# Patient Record
Sex: MALE | Race: WHITE | NOT HISPANIC OR LATINO | Employment: OTHER | URBAN - METROPOLITAN AREA
[De-identification: names, ages, dates, MRNs, and addresses within clinical notes are randomized per-mention and may not be internally consistent; named-entity substitution may affect disease eponyms.]

---

## 2022-07-01 ENCOUNTER — TRANSCRIBE ORDERS (OUTPATIENT)
Dept: ADMINISTRATIVE | Facility: HOSPITAL | Age: 77
End: 2022-07-01

## 2022-07-01 DIAGNOSIS — N18.32 STAGE 3B CHRONIC KIDNEY DISEASE: Primary | ICD-10-CM

## 2022-07-22 ENCOUNTER — TRANSCRIBE ORDERS (OUTPATIENT)
Dept: ADMINISTRATIVE | Facility: HOSPITAL | Age: 77
End: 2022-07-22

## 2022-07-22 ENCOUNTER — HOSPITAL ENCOUNTER (OUTPATIENT)
Dept: ULTRASOUND IMAGING | Facility: HOSPITAL | Age: 77
Discharge: HOME | End: 2022-07-22

## 2022-07-22 ENCOUNTER — LAB (OUTPATIENT)
Dept: LAB | Facility: HOSPITAL | Age: 77
End: 2022-07-22

## 2022-07-22 DIAGNOSIS — E55.9 VITAMIN D DEFICIENCY: ICD-10-CM

## 2022-07-22 DIAGNOSIS — N18.30 STAGE 3 CHRONIC KIDNEY DISEASE, UNSPECIFIED WHETHER STAGE 3A OR 3B CKD: ICD-10-CM

## 2022-07-22 DIAGNOSIS — N18.32 STAGE 3B CHRONIC KIDNEY DISEASE: ICD-10-CM

## 2022-07-22 DIAGNOSIS — N18.30 STAGE 3 CHRONIC KIDNEY DISEASE, UNSPECIFIED WHETHER STAGE 3A OR 3B CKD: Primary | ICD-10-CM

## 2022-07-22 DIAGNOSIS — I51.9 MYXEDEMA HEART DISEASE: ICD-10-CM

## 2022-07-22 DIAGNOSIS — E11.8 DIABETIC COMPLICATION: ICD-10-CM

## 2022-07-22 DIAGNOSIS — E03.9 MYXEDEMA HEART DISEASE: ICD-10-CM

## 2022-07-22 LAB
25(OH)D3 SERPL-MCNC: 52.1 NG/ML (ref 30–100)
ALBUMIN SERPL-MCNC: 4.1 G/DL (ref 3.5–5.2)
ALBUMIN/GLOB SERPL: 1.4 G/DL
ALP SERPL-CCNC: 129 U/L (ref 39–117)
ALT SERPL W P-5'-P-CCNC: 23 U/L (ref 1–41)
ANION GAP SERPL CALCULATED.3IONS-SCNC: 15 MMOL/L (ref 5–15)
AST SERPL-CCNC: 21 U/L (ref 1–40)
BASOPHILS # BLD AUTO: 0.22 10*3/MM3 (ref 0–0.2)
BASOPHILS NFR BLD AUTO: 1.9 % (ref 0–1.5)
BILIRUB SERPL-MCNC: 0.4 MG/DL (ref 0–1.2)
BILIRUB UR QL STRIP: NEGATIVE
BUN SERPL-MCNC: 26 MG/DL (ref 8–23)
BUN/CREAT SERPL: 14.6 (ref 7–25)
CALCIUM SPEC-SCNC: 9.4 MG/DL (ref 8.6–10.5)
CHLORIDE SERPL-SCNC: 106 MMOL/L (ref 98–107)
CK SERPL-CCNC: 42 U/L (ref 20–200)
CLARITY UR: CLEAR
CO2 SERPL-SCNC: 20 MMOL/L (ref 22–29)
COLOR UR: YELLOW
CREAT SERPL-MCNC: 1.78 MG/DL (ref 0.76–1.27)
CREAT UR-MCNC: 179.9 MG/DL
DEPRECATED RDW RBC AUTO: 48.8 FL (ref 37–54)
EGFRCR SERPLBLD CKD-EPI 2021: 39 ML/MIN/1.73
EOSINOPHIL # BLD AUTO: 0.44 10*3/MM3 (ref 0–0.4)
EOSINOPHIL NFR BLD AUTO: 3.9 % (ref 0.3–6.2)
ERYTHROCYTE [DISTWIDTH] IN BLOOD BY AUTOMATED COUNT: 14.3 % (ref 12.3–15.4)
GLOBULIN UR ELPH-MCNC: 2.9 GM/DL
GLUCOSE SERPL-MCNC: 118 MG/DL (ref 65–99)
GLUCOSE UR STRIP-MCNC: NEGATIVE MG/DL
HBA1C MFR BLD: 6.4 % (ref 3.5–5.6)
HCT VFR BLD AUTO: 38.6 % (ref 37.5–51)
HGB BLD-MCNC: 13 G/DL (ref 13–17.7)
HGB UR QL STRIP.AUTO: NEGATIVE
IMM GRANULOCYTES # BLD AUTO: 0.16 10*3/MM3 (ref 0–0.05)
IMM GRANULOCYTES NFR BLD AUTO: 1.4 % (ref 0–0.5)
KETONES UR QL STRIP: ABNORMAL
LEUKOCYTE ESTERASE UR QL STRIP.AUTO: NEGATIVE
LYMPHOCYTES # BLD AUTO: 2.09 10*3/MM3 (ref 0.7–3.1)
LYMPHOCYTES NFR BLD AUTO: 18.4 % (ref 19.6–45.3)
MCH RBC QN AUTO: 31.6 PG (ref 26.6–33)
MCHC RBC AUTO-ENTMCNC: 33.7 G/DL (ref 31.5–35.7)
MCV RBC AUTO: 93.9 FL (ref 79–97)
MONOCYTES # BLD AUTO: 0.96 10*3/MM3 (ref 0.1–0.9)
MONOCYTES NFR BLD AUTO: 8.4 % (ref 5–12)
NEUTROPHILS NFR BLD AUTO: 66 % (ref 42.7–76)
NEUTROPHILS NFR BLD AUTO: 7.51 10*3/MM3 (ref 1.7–7)
NITRITE UR QL STRIP: NEGATIVE
NRBC BLD AUTO-RTO: 0 /100 WBC (ref 0–0.2)
PH UR STRIP.AUTO: 5.5 [PH] (ref 5–8)
PLATELET # BLD AUTO: 531 10*3/MM3 (ref 140–450)
PMV BLD AUTO: 10.2 FL (ref 6–12)
POTASSIUM SERPL-SCNC: 4 MMOL/L (ref 3.5–5.2)
PROT ?TM UR-MCNC: 19.4 MG/DL
PROT SERPL-MCNC: 7 G/DL (ref 6–8.5)
PROT UR QL STRIP: ABNORMAL
PROT/CREAT UR: 107.8 MG/G CREA (ref 0–200)
PTH-INTACT SERPL-MCNC: 50 PG/ML (ref 15–65)
RBC # BLD AUTO: 4.11 10*6/MM3 (ref 4.14–5.8)
SODIUM SERPL-SCNC: 141 MMOL/L (ref 136–145)
SP GR UR STRIP: 1.02 (ref 1–1.03)
TSH SERPL DL<=0.05 MIU/L-ACNC: 1.88 UIU/ML (ref 0.27–4.2)
URATE SERPL-MCNC: 7.4 MG/DL (ref 3.4–7)
UROBILINOGEN UR QL STRIP: ABNORMAL
WBC NRBC COR # BLD: 11.38 10*3/MM3 (ref 3.4–10.8)

## 2022-07-22 PROCEDURE — 82550 ASSAY OF CK (CPK): CPT

## 2022-07-22 PROCEDURE — 86038 ANTINUCLEAR ANTIBODIES: CPT

## 2022-07-22 PROCEDURE — 82306 VITAMIN D 25 HYDROXY: CPT

## 2022-07-22 PROCEDURE — 82570 ASSAY OF URINE CREATININE: CPT

## 2022-07-22 PROCEDURE — 84443 ASSAY THYROID STIM HORMONE: CPT

## 2022-07-22 PROCEDURE — 76775 US EXAM ABDO BACK WALL LIM: CPT

## 2022-07-22 PROCEDURE — 83970 ASSAY OF PARATHORMONE: CPT

## 2022-07-22 PROCEDURE — 82784 ASSAY IGA/IGD/IGG/IGM EACH: CPT

## 2022-07-22 PROCEDURE — 84550 ASSAY OF BLOOD/URIC ACID: CPT

## 2022-07-22 PROCEDURE — 84156 ASSAY OF PROTEIN URINE: CPT

## 2022-07-22 PROCEDURE — 36415 COLL VENOUS BLD VENIPUNCTURE: CPT

## 2022-07-22 PROCEDURE — 85025 COMPLETE CBC W/AUTO DIFF WBC: CPT

## 2022-07-22 PROCEDURE — 80053 COMPREHEN METABOLIC PANEL: CPT

## 2022-07-22 PROCEDURE — 86334 IMMUNOFIX E-PHORESIS SERUM: CPT

## 2022-07-22 PROCEDURE — 81003 URINALYSIS AUTO W/O SCOPE: CPT

## 2022-07-22 PROCEDURE — 83036 HEMOGLOBIN GLYCOSYLATED A1C: CPT

## 2022-07-25 LAB
ANA SER QL: NEGATIVE
IGA SERPL-MCNC: 318 MG/DL (ref 61–437)
IGG SERPL-MCNC: 875 MG/DL (ref 603–1613)
IGM SERPL-MCNC: 104 MG/DL (ref 15–143)
PROT PATTERN SERPL IFE-IMP: NORMAL

## 2023-08-02 ENCOUNTER — TRANSCRIBE ORDERS (OUTPATIENT)
Dept: ADMINISTRATIVE | Facility: HOSPITAL | Age: 78
End: 2023-08-02
Payer: MEDICARE

## 2023-08-02 ENCOUNTER — LAB (OUTPATIENT)
Dept: LAB | Facility: HOSPITAL | Age: 78
End: 2023-08-02
Payer: MEDICARE

## 2023-08-02 DIAGNOSIS — E79.0 URICACIDEMIA: ICD-10-CM

## 2023-08-02 DIAGNOSIS — R80.9 PROTEINURIA, UNSPECIFIED TYPE: ICD-10-CM

## 2023-08-02 DIAGNOSIS — E11.8 DIABETIC COMPLICATION: ICD-10-CM

## 2023-08-02 DIAGNOSIS — N18.30 STAGE 3 CHRONIC KIDNEY DISEASE, UNSPECIFIED WHETHER STAGE 3A OR 3B CKD: ICD-10-CM

## 2023-08-02 DIAGNOSIS — E55.9 AVITAMINOSIS D: ICD-10-CM

## 2023-08-02 DIAGNOSIS — E78.5 HYPERLIPIDEMIA, UNSPECIFIED HYPERLIPIDEMIA TYPE: ICD-10-CM

## 2023-08-02 DIAGNOSIS — E03.9 ADULT MYXEDEMA: ICD-10-CM

## 2023-08-02 DIAGNOSIS — N18.30 STAGE 3 CHRONIC KIDNEY DISEASE, UNSPECIFIED WHETHER STAGE 3A OR 3B CKD: Primary | ICD-10-CM

## 2023-08-02 LAB
25(OH)D3 SERPL-MCNC: 44.9 NG/ML (ref 30–100)
ANION GAP SERPL CALCULATED.3IONS-SCNC: 13 MMOL/L (ref 5–15)
BASOPHILS # BLD AUTO: 0 10*3/MM3 (ref 0–0.2)
BASOPHILS NFR BLD AUTO: 0.3 % (ref 0–1.5)
BILIRUB UR QL STRIP: NEGATIVE
BUN SERPL-MCNC: 25 MG/DL (ref 8–23)
BUN/CREAT SERPL: 12.4 (ref 7–25)
CALCIUM SPEC-SCNC: 9 MG/DL (ref 8.6–10.5)
CHLORIDE SERPL-SCNC: 104 MMOL/L (ref 98–107)
CK SERPL-CCNC: 57 U/L (ref 20–200)
CLARITY UR: CLEAR
CO2 SERPL-SCNC: 23 MMOL/L (ref 22–29)
COLOR UR: YELLOW
CREAT SERPL-MCNC: 2.02 MG/DL (ref 0.76–1.27)
CREAT UR-MCNC: 139.2 MG/DL
DEPRECATED RDW RBC AUTO: 52.9 FL (ref 37–54)
EGFRCR SERPLBLD CKD-EPI 2021: 33.3 ML/MIN/1.73
EOSINOPHIL # BLD AUTO: 0.3 10*3/MM3 (ref 0–0.4)
EOSINOPHIL NFR BLD AUTO: 4 % (ref 0.3–6.2)
ERYTHROCYTE [DISTWIDTH] IN BLOOD BY AUTOMATED COUNT: 16 % (ref 12.3–15.4)
GLUCOSE SERPL-MCNC: 110 MG/DL (ref 65–99)
GLUCOSE UR STRIP-MCNC: ABNORMAL MG/DL
HBA1C MFR BLD: 6.3 % (ref 4.8–5.6)
HCT VFR BLD AUTO: 42.9 % (ref 37.5–51)
HGB BLD-MCNC: 14.1 G/DL (ref 13–17.7)
HGB UR QL STRIP.AUTO: NEGATIVE
KETONES UR QL STRIP: ABNORMAL
LEUKOCYTE ESTERASE UR QL STRIP.AUTO: NEGATIVE
LYMPHOCYTES # BLD AUTO: 1.4 10*3/MM3 (ref 0.7–3.1)
LYMPHOCYTES NFR BLD AUTO: 17.5 % (ref 19.6–45.3)
MCH RBC QN AUTO: 31.3 PG (ref 26.6–33)
MCHC RBC AUTO-ENTMCNC: 32.8 G/DL (ref 31.5–35.7)
MCV RBC AUTO: 95.6 FL (ref 79–97)
MONOCYTES # BLD AUTO: 0.7 10*3/MM3 (ref 0.1–0.9)
MONOCYTES NFR BLD AUTO: 8.8 % (ref 5–12)
NEUTROPHILS NFR BLD AUTO: 5.6 10*3/MM3 (ref 1.7–7)
NEUTROPHILS NFR BLD AUTO: 69.4 % (ref 42.7–76)
NITRITE UR QL STRIP: NEGATIVE
NRBC BLD AUTO-RTO: 0 /100 WBC (ref 0–0.2)
PH UR STRIP.AUTO: <=5 [PH] (ref 5–8)
PHOSPHATE SERPL-MCNC: 3.7 MG/DL (ref 2.5–4.5)
PLATELET # BLD AUTO: 379 10*3/MM3 (ref 140–450)
PMV BLD AUTO: 8.4 FL (ref 6–12)
POTASSIUM SERPL-SCNC: 3.9 MMOL/L (ref 3.5–5.2)
PROT ?TM UR-MCNC: 12.4 MG/DL
PROT UR QL STRIP: NEGATIVE
PROT/CREAT UR: 89.1 MG/G CREA (ref 0–200)
PTH-INTACT SERPL-MCNC: 68.7 PG/ML (ref 15–65)
RBC # BLD AUTO: 4.49 10*6/MM3 (ref 4.14–5.8)
SODIUM SERPL-SCNC: 140 MMOL/L (ref 136–145)
SP GR UR STRIP: 1.03 (ref 1–1.03)
TSH SERPL DL<=0.05 MIU/L-ACNC: 1.97 UIU/ML (ref 0.27–4.2)
URATE SERPL-MCNC: 5.9 MG/DL (ref 3.4–7)
UROBILINOGEN UR QL STRIP: ABNORMAL
WBC NRBC COR # BLD: 8.1 10*3/MM3 (ref 3.4–10.8)

## 2023-08-02 PROCEDURE — 81003 URINALYSIS AUTO W/O SCOPE: CPT

## 2023-08-02 PROCEDURE — 36415 COLL VENOUS BLD VENIPUNCTURE: CPT

## 2023-08-02 PROCEDURE — 83970 ASSAY OF PARATHORMONE: CPT

## 2023-08-02 PROCEDURE — 82550 ASSAY OF CK (CPK): CPT

## 2023-08-02 PROCEDURE — 82570 ASSAY OF URINE CREATININE: CPT

## 2023-08-02 PROCEDURE — 84550 ASSAY OF BLOOD/URIC ACID: CPT

## 2023-08-02 PROCEDURE — 84443 ASSAY THYROID STIM HORMONE: CPT

## 2023-08-02 PROCEDURE — 82306 VITAMIN D 25 HYDROXY: CPT

## 2023-08-02 PROCEDURE — 84156 ASSAY OF PROTEIN URINE: CPT

## 2023-08-02 PROCEDURE — 84100 ASSAY OF PHOSPHORUS: CPT

## 2023-08-02 PROCEDURE — 83036 HEMOGLOBIN GLYCOSYLATED A1C: CPT

## 2023-08-02 PROCEDURE — 80048 BASIC METABOLIC PNL TOTAL CA: CPT

## 2023-08-02 PROCEDURE — 85025 COMPLETE CBC W/AUTO DIFF WBC: CPT

## 2024-07-08 ENCOUNTER — HOSPITAL ENCOUNTER (INPATIENT)
Facility: HOSPITAL | Age: 79
LOS: 2 days | Discharge: NURSING, REHAB OR LONG-TERM CARE | End: 2024-07-12
Attending: INTERNAL MEDICINE | Admitting: INTERNAL MEDICINE
Payer: MEDICARE

## 2024-07-08 ENCOUNTER — APPOINTMENT (OUTPATIENT)
Dept: MRI IMAGING | Facility: HOSPITAL | Age: 79
End: 2024-07-08
Payer: MEDICARE

## 2024-07-08 ENCOUNTER — APPOINTMENT (OUTPATIENT)
Dept: GENERAL RADIOLOGY | Facility: HOSPITAL | Age: 79
End: 2024-07-08
Payer: MEDICARE

## 2024-07-08 ENCOUNTER — APPOINTMENT (OUTPATIENT)
Dept: CT IMAGING | Facility: HOSPITAL | Age: 79
End: 2024-07-08
Payer: MEDICARE

## 2024-07-08 DIAGNOSIS — R29.6 MULTIPLE FALLS: ICD-10-CM

## 2024-07-08 DIAGNOSIS — M25.551 ACUTE HIP PAIN, RIGHT: Primary | ICD-10-CM

## 2024-07-08 DIAGNOSIS — S82.64XA CLOSED NONDISPLACED FRACTURE OF LATERAL MALLEOLUS OF RIGHT FIBULA, INITIAL ENCOUNTER: ICD-10-CM

## 2024-07-08 LAB
ALBUMIN SERPL-MCNC: 4.6 G/DL (ref 3.5–5.2)
ALBUMIN/GLOB SERPL: 1.5 G/DL
ALP SERPL-CCNC: 134 U/L (ref 39–117)
ALT SERPL W P-5'-P-CCNC: 26 U/L (ref 1–41)
AMMONIA BLD-SCNC: 17 UMOL/L (ref 16–60)
ANION GAP SERPL CALCULATED.3IONS-SCNC: 15.9 MMOL/L (ref 5–15)
AST SERPL-CCNC: 26 U/L (ref 1–40)
B PARAPERT DNA SPEC QL NAA+PROBE: NOT DETECTED
B PERT DNA SPEC QL NAA+PROBE: NOT DETECTED
BASOPHILS # BLD AUTO: 0.03 10*3/MM3 (ref 0–0.2)
BASOPHILS NFR BLD AUTO: 0.1 % (ref 0–1.5)
BILIRUB SERPL-MCNC: 0.5 MG/DL (ref 0–1.2)
BUN SERPL-MCNC: 33 MG/DL (ref 8–23)
BUN/CREAT SERPL: 17.6 (ref 7–25)
C PNEUM DNA NPH QL NAA+NON-PROBE: NOT DETECTED
CALCIUM SPEC-SCNC: 9.7 MG/DL (ref 8.6–10.5)
CHLORIDE SERPL-SCNC: 104 MMOL/L (ref 98–107)
CK SERPL-CCNC: 138 U/L (ref 20–200)
CO2 SERPL-SCNC: 21.1 MMOL/L (ref 22–29)
CREAT SERPL-MCNC: 1.87 MG/DL (ref 0.76–1.27)
DEPRECATED RDW RBC AUTO: 57.6 FL (ref 37–54)
EGFRCR SERPLBLD CKD-EPI 2021: 36.3 ML/MIN/1.73
EOSINOPHIL # BLD AUTO: 0 10*3/MM3 (ref 0–0.4)
EOSINOPHIL NFR BLD AUTO: 0 % (ref 0.3–6.2)
ERYTHROCYTE [DISTWIDTH] IN BLOOD BY AUTOMATED COUNT: 16 % (ref 12.3–15.4)
FLUAV SUBTYP SPEC NAA+PROBE: NOT DETECTED
FLUBV RNA ISLT QL NAA+PROBE: NOT DETECTED
GLOBULIN UR ELPH-MCNC: 3.1 GM/DL
GLUCOSE BLDC GLUCOMTR-MCNC: 192 MG/DL (ref 70–105)
GLUCOSE SERPL-MCNC: 141 MG/DL (ref 65–99)
HADV DNA SPEC NAA+PROBE: NOT DETECTED
HBA1C MFR BLD: 6.55 % (ref 4.8–5.6)
HCOV 229E RNA SPEC QL NAA+PROBE: NOT DETECTED
HCOV HKU1 RNA SPEC QL NAA+PROBE: NOT DETECTED
HCOV NL63 RNA SPEC QL NAA+PROBE: NOT DETECTED
HCOV OC43 RNA SPEC QL NAA+PROBE: NOT DETECTED
HCT VFR BLD AUTO: 45.9 % (ref 37.5–51)
HGB BLD-MCNC: 15.2 G/DL (ref 13–17.7)
HMPV RNA NPH QL NAA+NON-PROBE: NOT DETECTED
HOLD SPECIMEN: NORMAL
HOLD SPECIMEN: NORMAL
HPIV1 RNA ISLT QL NAA+PROBE: NOT DETECTED
HPIV2 RNA SPEC QL NAA+PROBE: NOT DETECTED
HPIV3 RNA NPH QL NAA+PROBE: NOT DETECTED
HPIV4 P GENE NPH QL NAA+PROBE: NOT DETECTED
IMM GRANULOCYTES # BLD AUTO: 0.16 10*3/MM3 (ref 0–0.05)
IMM GRANULOCYTES NFR BLD AUTO: 0.8 % (ref 0–0.5)
LYMPHOCYTES # BLD AUTO: 0.88 10*3/MM3 (ref 0.7–3.1)
LYMPHOCYTES NFR BLD AUTO: 4.3 % (ref 19.6–45.3)
M PNEUMO IGG SER IA-ACNC: NOT DETECTED
MCH RBC QN AUTO: 31.9 PG (ref 26.6–33)
MCHC RBC AUTO-ENTMCNC: 33.1 G/DL (ref 31.5–35.7)
MCV RBC AUTO: 96.2 FL (ref 79–97)
MONOCYTES # BLD AUTO: 1.69 10*3/MM3 (ref 0.1–0.9)
MONOCYTES NFR BLD AUTO: 8.3 % (ref 5–12)
NEUTROPHILS NFR BLD AUTO: 17.59 10*3/MM3 (ref 1.7–7)
NEUTROPHILS NFR BLD AUTO: 86.5 % (ref 42.7–76)
NRBC BLD AUTO-RTO: 0 /100 WBC (ref 0–0.2)
PLATELET # BLD AUTO: 469 10*3/MM3 (ref 140–450)
PMV BLD AUTO: 10.7 FL (ref 6–12)
POTASSIUM SERPL-SCNC: 3.7 MMOL/L (ref 3.5–5.2)
PROT SERPL-MCNC: 7.7 G/DL (ref 6–8.5)
RBC # BLD AUTO: 4.77 10*6/MM3 (ref 4.14–5.8)
RHINOVIRUS RNA SPEC NAA+PROBE: NOT DETECTED
RSV RNA NPH QL NAA+NON-PROBE: NOT DETECTED
SARS-COV-2 RNA NPH QL NAA+NON-PROBE: NOT DETECTED
SARS-COV-2 RNA RESP QL NAA+PROBE: NOT DETECTED
SODIUM SERPL-SCNC: 141 MMOL/L (ref 136–145)
WBC NRBC COR # BLD AUTO: 20.35 10*3/MM3 (ref 3.4–10.8)
WHOLE BLOOD HOLD COAG: NORMAL
WHOLE BLOOD HOLD SPECIMEN: NORMAL

## 2024-07-08 PROCEDURE — 99285 EMERGENCY DEPT VISIT HI MDM: CPT

## 2024-07-08 PROCEDURE — 80053 COMPREHEN METABOLIC PANEL: CPT | Performed by: NURSE PRACTITIONER

## 2024-07-08 PROCEDURE — G0378 HOSPITAL OBSERVATION PER HR: HCPCS

## 2024-07-08 PROCEDURE — 83036 HEMOGLOBIN GLYCOSYLATED A1C: CPT | Performed by: INTERNAL MEDICINE

## 2024-07-08 PROCEDURE — 36415 COLL VENOUS BLD VENIPUNCTURE: CPT

## 2024-07-08 PROCEDURE — 73610 X-RAY EXAM OF ANKLE: CPT

## 2024-07-08 PROCEDURE — 82550 ASSAY OF CK (CPK): CPT | Performed by: INTERNAL MEDICINE

## 2024-07-08 PROCEDURE — 63710000001 ONDANSETRON ODT 4 MG TABLET DISPERSIBLE: Performed by: NURSE PRACTITIONER

## 2024-07-08 PROCEDURE — 73700 CT LOWER EXTREMITY W/O DYE: CPT

## 2024-07-08 PROCEDURE — 73721 MRI JNT OF LWR EXTRE W/O DYE: CPT

## 2024-07-08 PROCEDURE — 87635 SARS-COV-2 COVID-19 AMP PRB: CPT | Performed by: INTERNAL MEDICINE

## 2024-07-08 PROCEDURE — 71045 X-RAY EXAM CHEST 1 VIEW: CPT

## 2024-07-08 PROCEDURE — 85025 COMPLETE CBC W/AUTO DIFF WBC: CPT | Performed by: NURSE PRACTITIONER

## 2024-07-08 PROCEDURE — 87040 BLOOD CULTURE FOR BACTERIA: CPT | Performed by: INTERNAL MEDICINE

## 2024-07-08 PROCEDURE — 82948 REAGENT STRIP/BLOOD GLUCOSE: CPT

## 2024-07-08 PROCEDURE — 82140 ASSAY OF AMMONIA: CPT | Performed by: INTERNAL MEDICINE

## 2024-07-08 PROCEDURE — 25010000002 DIPHENHYDRAMINE PER 50 MG

## 2024-07-08 PROCEDURE — 0202U NFCT DS 22 TRGT SARS-COV-2: CPT | Performed by: INTERNAL MEDICINE

## 2024-07-08 RX ORDER — DAPAGLIFLOZIN 10 MG/1
10 TABLET, FILM COATED ORAL DAILY
COMMUNITY

## 2024-07-08 RX ORDER — HYDROCODONE BITARTRATE AND ACETAMINOPHEN 7.5; 325 MG/1; MG/1
1 TABLET ORAL EVERY 4 HOURS PRN
Status: DISCONTINUED | OUTPATIENT
Start: 2024-07-08 | End: 2024-07-10

## 2024-07-08 RX ORDER — SODIUM CHLORIDE 0.9 % (FLUSH) 0.9 %
10 SYRINGE (ML) INJECTION AS NEEDED
Status: DISCONTINUED | OUTPATIENT
Start: 2024-07-08 | End: 2024-07-12 | Stop reason: HOSPADM

## 2024-07-08 RX ORDER — FLUOXETINE HYDROCHLORIDE 40 MG/1
40 CAPSULE ORAL DAILY
COMMUNITY

## 2024-07-08 RX ORDER — DIPHENHYDRAMINE HYDROCHLORIDE 50 MG/ML
50 INJECTION INTRAMUSCULAR; INTRAVENOUS ONCE
Status: COMPLETED | OUTPATIENT
Start: 2024-07-08 | End: 2024-07-08

## 2024-07-08 RX ORDER — ZOLPIDEM TARTRATE 10 MG/1
10 TABLET ORAL NIGHTLY PRN
COMMUNITY
End: 2024-07-12 | Stop reason: HOSPADM

## 2024-07-08 RX ORDER — LEVOTHYROXINE SODIUM 0.05 MG/1
50 TABLET ORAL DAILY
COMMUNITY

## 2024-07-08 RX ORDER — ONDANSETRON 2 MG/ML
4 INJECTION INTRAMUSCULAR; INTRAVENOUS EVERY 6 HOURS PRN
Status: DISCONTINUED | OUTPATIENT
Start: 2024-07-08 | End: 2024-07-12 | Stop reason: HOSPADM

## 2024-07-08 RX ORDER — ACETAMINOPHEN 325 MG/1
650 TABLET ORAL EVERY 4 HOURS PRN
Status: DISCONTINUED | OUTPATIENT
Start: 2024-07-08 | End: 2024-07-11

## 2024-07-08 RX ORDER — HYDROCODONE BITARTRATE AND ACETAMINOPHEN 7.5; 325 MG/1; MG/1
1 TABLET ORAL ONCE
Status: COMPLETED | OUTPATIENT
Start: 2024-07-08 | End: 2024-07-08

## 2024-07-08 RX ORDER — SODIUM CHLORIDE 0.9 % (FLUSH) 0.9 %
10 SYRINGE (ML) INJECTION EVERY 12 HOURS SCHEDULED
Status: DISCONTINUED | OUTPATIENT
Start: 2024-07-08 | End: 2024-07-12 | Stop reason: HOSPADM

## 2024-07-08 RX ORDER — AMOXICILLIN 250 MG
2 CAPSULE ORAL 2 TIMES DAILY PRN
Status: DISCONTINUED | OUTPATIENT
Start: 2024-07-08 | End: 2024-07-12 | Stop reason: HOSPADM

## 2024-07-08 RX ORDER — LIDOCAINE 4 G/G
1 PATCH TOPICAL ONCE
Status: DISCONTINUED | OUTPATIENT
Start: 2024-07-08 | End: 2024-07-09

## 2024-07-08 RX ORDER — SODIUM CHLORIDE 9 MG/ML
40 INJECTION, SOLUTION INTRAVENOUS AS NEEDED
Status: DISCONTINUED | OUTPATIENT
Start: 2024-07-08 | End: 2024-07-12 | Stop reason: HOSPADM

## 2024-07-08 RX ORDER — FAMOTIDINE 20 MG/1
40 TABLET, FILM COATED ORAL DAILY
Status: DISCONTINUED | OUTPATIENT
Start: 2024-07-08 | End: 2024-07-10

## 2024-07-08 RX ORDER — BISACODYL 10 MG
10 SUPPOSITORY, RECTAL RECTAL DAILY PRN
Status: DISCONTINUED | OUTPATIENT
Start: 2024-07-08 | End: 2024-07-12 | Stop reason: HOSPADM

## 2024-07-08 RX ORDER — METHOCARBAMOL 750 MG/1
750 TABLET, FILM COATED ORAL 3 TIMES DAILY PRN
COMMUNITY

## 2024-07-08 RX ORDER — METHOCARBAMOL 500 MG/1
500 TABLET, FILM COATED ORAL ONCE
Status: COMPLETED | OUTPATIENT
Start: 2024-07-08 | End: 2024-07-08

## 2024-07-08 RX ORDER — ALLOPURINOL 100 MG/1
100 TABLET ORAL DAILY
COMMUNITY

## 2024-07-08 RX ORDER — AMLODIPINE BESYLATE 5 MG/1
5 TABLET ORAL DAILY
COMMUNITY

## 2024-07-08 RX ORDER — PIOGLITAZONEHYDROCHLORIDE 30 MG/1
30 TABLET ORAL DAILY
COMMUNITY

## 2024-07-08 RX ORDER — POLYETHYLENE GLYCOL 3350 17 G/17G
17 POWDER, FOR SOLUTION ORAL DAILY PRN
Status: DISCONTINUED | OUTPATIENT
Start: 2024-07-08 | End: 2024-07-12 | Stop reason: HOSPADM

## 2024-07-08 RX ORDER — ATORVASTATIN CALCIUM 40 MG/1
40 TABLET, FILM COATED ORAL DAILY
COMMUNITY

## 2024-07-08 RX ORDER — ONDANSETRON 4 MG/1
4 TABLET, ORALLY DISINTEGRATING ORAL ONCE
Status: COMPLETED | OUTPATIENT
Start: 2024-07-08 | End: 2024-07-08

## 2024-07-08 RX ORDER — BISACODYL 5 MG/1
5 TABLET, DELAYED RELEASE ORAL DAILY PRN
Status: DISCONTINUED | OUTPATIENT
Start: 2024-07-08 | End: 2024-07-12 | Stop reason: HOSPADM

## 2024-07-08 RX ADMIN — ONDANSETRON 4 MG: 4 TABLET, ORALLY DISINTEGRATING ORAL at 15:49

## 2024-07-08 RX ADMIN — DIPHENHYDRAMINE HYDROCHLORIDE 50 MG: 50 INJECTION, SOLUTION INTRAMUSCULAR; INTRAVENOUS at 23:12

## 2024-07-08 RX ADMIN — FAMOTIDINE 40 MG: 20 TABLET, FILM COATED ORAL at 23:12

## 2024-07-08 RX ADMIN — LIDOCAINE 1 PATCH: 4 PATCH TOPICAL at 15:48

## 2024-07-08 RX ADMIN — METHOCARBAMOL 500 MG: 500 TABLET ORAL at 15:49

## 2024-07-08 RX ADMIN — Medication 10 ML: at 23:13

## 2024-07-08 RX ADMIN — HYDROCODONE BITARTRATE AND ACETAMINOPHEN 1 TABLET: 7.5; 325 TABLET ORAL at 15:49

## 2024-07-08 NOTE — ED PROVIDER NOTES
Subjective     Provider in Triage Note  Due to significant overcrowding in the emergency department patient was initially seen and evaluated in triage.  Provider in triage recommended patient placement in the treatment area to initiate therapy and movement to an ER bed as soon as possible.    Patient presents with complaints of right hip and lower back pain.  Recently seen by [facility] a few days ago and diagnosed with sciatica. Given steroids and muscle relaxers with no improvement.  Fell twice today due to pain and weakness in right leg.  Injured right ankle.  No saddle anesthesia.  No bowel or bladder incontinence.      History of Present Illness  I reviewed the provider in triage note and attest this is the HPI    Patient was seen on July 5 at [facility] urgent care and had an x-ray which was read as no immediate fracture he was referred to Dr. Faria the neurosurgeon for further evaluation but he has not seen them he was given a Decadron 10 mg IM injection at that time.  Pain has increased and he had another fall this morning  He lives at home alone        Review of Systems   Musculoskeletal:         Right hip pain  Right ankle pain and swelling   Skin:  Positive for color change.        Right ankle         History reviewed. No pertinent past medical history.    No Known Allergies    History reviewed. No pertinent surgical history.    History reviewed. No pertinent family history.    Social History     Socioeconomic History    Marital status: Unknown           Objective   Physical Exam  Vitals reviewed.   Constitutional:       Appearance: He is well-developed.   HENT:      Head: Normocephalic and atraumatic.   Eyes:      Conjunctiva/sclera: Conjunctivae normal.      Pupils: Pupils are equal, round, and reactive to light.   Cardiovascular:      Rate and Rhythm: Normal rate and regular rhythm.      Heart sounds: Normal heart sounds.   Pulmonary:      Effort: Pulmonary effort is normal.      Breath sounds: Normal breath  "sounds.   Abdominal:      General: Bowel sounds are normal.      Palpations: Abdomen is soft.   Musculoskeletal:         General: Normal range of motion.      Cervical back: Normal, normal range of motion and neck supple.      Thoracic back: Normal.      Lumbar back: Normal.      Right hip: Tenderness present. Decreased range of motion. Decreased strength.      Left hip: Normal.        Legs:    Skin:     General: Skin is warm and dry.      Capillary Refill: Capillary refill takes 2 to 3 seconds.   Neurological:      General: No focal deficit present.      Mental Status: He is alert and oriented to person, place, and time.      GCS: GCS eye subscore is 4. GCS verbal subscore is 5. GCS motor subscore is 6.         Procedures       CAM Walker placed on the right foot the patient was distally neurovascular intact after placement    ED Course  ED Course as of 07/08/24 1731 Mon Jul 08, 2024   1544 Marked ready for CT [KW]   1700 Spoke to Dr. Valadez who agreed to admit [KW]   1715 Dr. Valadez at bedside [KW]      ED Course User Index  [KW] Norma Ledezma, APRN                                 /73   Pulse 92   Temp 97.8 °F (36.6 °C) (Oral)   Resp 18   Ht 177.8 cm (70\")   Wt 77.1 kg (170 lb)   SpO2 92%   BMI 24.39 kg/m²   Labs Reviewed   CBC WITH AUTO DIFFERENTIAL - Abnormal; Notable for the following components:       Result Value    WBC 20.35 (*)     RDW 16.0 (*)     RDW-SD 57.6 (*)     Platelets 469 (*)     Neutrophil % 86.5 (*)     Lymphocyte % 4.3 (*)     Eosinophil % 0.0 (*)     Immature Grans % 0.8 (*)     Neutrophils, Absolute 17.59 (*)     Monocytes, Absolute 1.69 (*)     Immature Grans, Absolute 0.16 (*)     All other components within normal limits   RAINBOW DRAW    Narrative:     The following orders were created for panel order [place] Draw.  Procedure                               Abnormality         Status                     ---------                               -----------         ------ "                     Green Top (Gel)[[id number]]                                  Final result               Lavender Top[[id number]]                                     Final result               Gold Top - SST[[id number]]                                   Final result               Light Blue Top[[id number]]                                   Final result                 Please view results for these tests on the individual orders.   COMPREHENSIVE METABOLIC PANEL   GREEN TOP   LAVENDER TOP   GOLD TOP - SST   LIGHT BLUE TOP   CBC AND DIFFERENTIAL    Narrative:     The following orders were created for panel order CBC & Differential.  Procedure                               Abnormality         Status                     ---------                               -----------         ------                     CBC Auto Differential[[id number]]        Abnormal            Final result                 Please view results for these tests on the individual orders.     Medications   sodium chloride 0.9 % flush 10 mL (has no administration in time range)   sodium chloride 0.9 % flush 10 mL (has no administration in time range)   Lidocaine 4 % 1 patch (1 patch Transdermal Medication Applied 7/8/24 1548)   sodium chloride 0.9 % flush 10 mL (has no administration in time range)   methocarbamol (ROBAXIN) tablet 500 mg (500 mg Oral Given 7/8/24 1549)   HYDROcodone-acetaminophen (NORCO) 7.5-325 MG per tablet 1 tablet (1 tablet Oral Given 7/8/24 1549)   ondansetron ODT (ZOFRAN-ODT) disintegrating tablet 4 mg (4 mg Oral Given 7/8/24 1549)     CT Lower Extremity Right Without Contrast    Result Date: 7/8/2024  Impression: CT of the hip is nondiagnostic for hip fracture. There is a questionable nondisplaced fracture of the femoral neck could be seen with nondisplaced fracture or vascular groove. If patient has difficulty weightbearing or pain, MRI of the hip is recommended  for better evaluation. Osteopenia limits evaluation. Moderate arthritis  of the hip. Electronically Signed: Oralia Tanner MD  7/8/2024 4:50 PM EDT  Workstation ID: [id number]    XR Ankle 3+ View Right    Result Date: 7/8/2024  Impression: Minimally displaced fracture through the distal fibula/lateral malleolus. Electronically Signed: Walker DO Adolph  7/8/2024 3:28 PM EDT  Workstation ID: [id number]               Medical Decision Making  Patient is a 79-year-old gentleman who has had multiple falls with complaints of right hip pain that is worsening since he was seen at urgent care 3 days ago-he also complains of right ankle pain and swelling patient had above exam and x-rays of the hip had been done at the urgent care so I did a CT which was interpreted by the radiologist as having a possible nondisplaced fracture which is really inconclusive and states that an MRI might be better suited.  The patient also had an x-ray of the right ankle which shows a nondisplaced fracture of the distal fibula this was placed in a cam walker and the patient was distally neurovascularly intact after placement.  The results were discussed with Dr. Valadez who agreed to admit as the patient needs physical therapy consultation and the possibility of rehabilitation.  The patient was agreeable this plan of care.    Problems Addressed:  Acute hip pain, right: complicated acute illness or injury  Multiple falls: complicated acute illness or injury    Amount and/or Complexity of Data Reviewed  Independent Historian: caregiver     Details: Female friend at bedside  External Data Reviewed: radiology and notes.     Details: From his urgent care visit 3 days ago  Labs: ordered. Decision-making details documented in ED Course.  Radiology: ordered and independent interpretation performed. Decision-making details documented in ED Course.  ECG/medicine tests: ordered and independent interpretation performed. Decision-making details documented in ED Course.    Risk  OTC drugs.  Prescription drug management.  Decision  regarding hospitalization.        Final diagnoses:   Acute hip pain, right   Multiple falls   Closed nondisplaced fracture of lateral malleolus of right fibula, initial encounter       ED Disposition  ED Disposition       ED Disposition   Decision to Admit    Condition   --    Comment   Level of Care: Med/Surg [1]   Admitting Physician: LENO MIRANDA [[id number]]   Attending Physician: LENO MIRANDA [[id number]]                 No follow-up provider specified.       Medication List      No changes were made to your prescriptions during this visit.            Norma Ledezma, APRN  07/08/24 1735     Detail Level: Detailed Quality 110: Preventive Care And Screening: Influenza Immunization: Influenza Immunization Administered during Influenza season

## 2024-07-08 NOTE — H&P
Patient Care Team:  Sonia Valadez MD as PCP - General (Internal Medicine)    Chief complaint Right hip and ankle pain    Subjective     Patient is a 79 y.o. male with h/o CKD stage 3, diabetes, chronic insomnia with dependence on Ambien who presents with 2 week history of right hip pain with frequent falls.  He is confused and can not provide details, other than telling me has fallen several times.  A family friend provides additional history that at least twice he has fallen and has required assistance to get up off the floor.  He was evaluated recently at urgent care for right hip pain and had normal xray.  He was given steroids and muscle relaxer without significant improvement.  Last night he fell again and injured his right ankle and is unable to bear weight.  He has declined over the last few months since the death of his dog with deteriorating memory, no exercise and eating only fast food.  He sleeps most of the day and stays awake at night.      In the ER, ct of hip suggests a possible nondisplaced femoral neck fracture.  Right ankle films show a distal fibular fracture.  WBC count is elevated.    Review of Systems   Constitutional:  Positive for activity change and fatigue. Negative for appetite change, chills, diaphoresis, fever and unexpected weight change.   HENT:  Negative for facial swelling.    Eyes:  Negative for visual disturbance.   Respiratory:  Negative for cough, shortness of breath, wheezing and stridor.    Cardiovascular:  Negative for chest pain, palpitations and leg swelling.   Gastrointestinal:  Negative for abdominal pain, constipation, diarrhea, nausea and vomiting.   Genitourinary:  Negative for enuresis, flank pain, frequency and urgency.   Musculoskeletal:  Positive for arthralgias, gait problem and myalgias. Negative for neck pain and neck stiffness.   Skin:  Positive for wound. Negative for rash.   Neurological:  Negative for weakness.   Psychiatric/Behavioral:  Positive for  confusion.           History  History reviewed. No pertinent past medical history.  History reviewed. No pertinent surgical history.  History reviewed. No pertinent family history.     (Not in a hospital admission)    Allergies:  Patient has no known allergies.    Objective     Vital Signs  Temp:  [97.8 °F (36.6 °C)] 97.8 °F (36.6 °C)  Heart Rate:  [79-96] 79  Resp:  [18] 18  BP: (129-151)/(61-94) 129/61     Physical Exam:      General Appearance:    Alert, cooperative, in no acute distress   Head:    Normocephalic, without obvious abnormality, atraumatic   Eyes:            Lids and lashes normal, conjunctivae and sclerae normal, no   icterus, no pallor, corneas clear, PERRLA   Ears:    Ears appear intact with no abnormalities noted   Throat:   No oral lesions, no thrush, oral mucosa moist   Neck:   No adenopathy, supple, trachea midline, no thyromegaly, no   carotid bruit, no JVD   Lungs:     Clear to auscultation,respirations regular, even and                  unlabored    Heart:    Irregular   Chest Wall:    No abnormalities observed   Abdomen:     Normal bowel sounds, no masses, no organomegaly, soft        non-tender, non-distended, no guarding, no rebound                tenderness   Extremities:   Right foot - marked STS, ecchymosis and tenderness of lateral malleolus ; limited ROM in right hip with IR/ER, no specific pain elicited with IR/ER   Pulses:   Pulses palpable and equal bilaterally   Skin:   Scattered superficial ecchymoses of varying ages on all 4 extremities   Lymph nodes:   No palpable adenopathy   Neurologic:   Oriented to person, place, year; not month; appears dazed.  No focal neurologic deficits.       Results Review:     Imaging Results (Last 24 Hours)       Procedure Component Value Units Date/Time    CT Lower Extremity Right Without Contrast [[id number]] Collected: 07/08/24 1646     Updated: 07/08/24 1653    Narrative:      CT LOWER EXTREMITY RIGHT WO CONTRAST    Date of Exam: 7/8/2024 4:42  PM EDT    Indication: right hip pain/ falls neg xray.    Comparison: None available.    Technique: Axial CT images were obtained of the right lower extremity without contrast administration.  Sagittal and coronal reconstructions were performed.  Automated exposure control and iterative reconstruction methods were used.      Findings:  Osteopenia limits evaluation for nondisplaced fractures. If concern for radiographically occult fracture MRI study of choice. There is questionable nondisplaced fracture at the femoral neck best seen on the axial image #158. Avascular channel cannot be   excluded. MRI of the hip is recommended if patient has difficulty weightbearing or pain.    There is moderate degenerative change of the hip joint. There are no aggressive osseous lesions.    No significant joint effusion is seen on CT. There are vascular calcifications. There are diverticuli in the colon.      Impression:      Impression:  CT of the hip is nondiagnostic for hip fracture. There is a questionable nondisplaced fracture of the femoral neck could be seen with nondisplaced fracture or vascular groove. If patient has difficulty weightbearing or pain, MRI of the hip is recommended   for better evaluation.  Osteopenia limits evaluation.  Moderate arthritis of the hip.        Electronically Signed: Oralia Tanner MD    7/8/2024 4:50 PM EDT    Workstation ID: [id number]    XR Ankle 3+ View Right [[id number]] Collected: 07/08/24 1526     Updated: 07/08/24 1530    Narrative:      XR ANKLE 3+ VW RIGHT    Date of Exam: 7/8/2024 3:18 PM EDT    Indication: pain after falling    Comparison: None available.    Findings:  Minimally displaced fracture through the distal fibula/lateral malleolus. The remainder of the osseous structures are intact.    The ankle mortise is intact.  Mild degenerative changes of the ankle.    Soft tissue edema surrounding the ankle.      Impression:      Impression:  Minimally displaced fracture through the distal  fibula/lateral malleolus.      Electronically Signed: Walker DO Adolph    7/8/2024 3:28 PM EDT    Workstation ID: [id number]             Lab Results (last 24 hours)       Procedure Component Value Units Date/Time    COVID-19,CEPHEID/SELMA,COR/ANASTASIA/PAD/MITESH/LAG/MARY IN-HOUSE,NP SWAB IN TRANSPORT MEDIA 1 HR TAT, RT-PCR - Swab, Nasopharynx [[id number]] Collected: 07/08/24 1801    Specimen: Swab from Nasopharynx Updated: 07/08/24 1804    Comprehensive Metabolic Panel [[id number]]  (Abnormal) Collected: 07/08/24 1459    Specimen: Blood Updated: 07/08/24 1751     Glucose 141 mg/dL      BUN 33 mg/dL      Creatinine 1.87 mg/dL      Sodium 141 mmol/L      Potassium 3.7 mmol/L      Chloride 104 mmol/L      CO2 21.1 mmol/L      Calcium 9.7 mg/dL      Total Protein 7.7 g/dL      Albumin 4.6 g/dL      ALT (SGPT) 26 U/L      AST (SGOT) 26 U/L      Alkaline Phosphatase 134 U/L      Total Bilirubin 0.5 mg/dL      Globulin 3.1 gm/dL      A/G Ratio 1.5 g/dL      BUN/Creatinine Ratio 17.6     Anion Gap 15.9 mmol/L      eGFR 36.3 mL/min/1.73     Narrative:      GFR Normal >60  Chronic Kidney Disease <60  Kidney Failure <15    The GFR formula is only valid for adults with stable renal function between ages 18 and 70.    CBC & Differential [[id number]]  (Abnormal) Collected: 07/08/24 1459    Specimen: Blood Updated: 07/08/24 1710    Narrative:      The following orders were created for panel order CBC & Differential.  Procedure                               Abnormality         Status                     ---------                               -----------         ------                     CBC Auto Differential[[id number]]        Abnormal            Final result                 Please view results for these tests on the individual orders.    CBC Auto Differential [[id number]]  (Abnormal) Collected: 07/08/24 1459    Specimen: Blood Updated: 07/08/24 1710     WBC 20.35 10*3/mm3      RBC 4.77 10*6/mm3      Hemoglobin 15.2 g/dL      Hematocrit 45.9  %      MCV 96.2 fL      MCH 31.9 pg      MCHC 33.1 g/dL      RDW 16.0 %      RDW-SD 57.6 fl      MPV 10.7 fL      Platelets 469 10*3/mm3      Neutrophil % 86.5 %      Lymphocyte % 4.3 %      Monocyte % 8.3 %      Eosinophil % 0.0 %      Basophil % 0.1 %      Immature Grans % 0.8 %      Neutrophils, Absolute 17.59 10*3/mm3      Lymphocytes, Absolute 0.88 10*3/mm3      Monocytes, Absolute 1.69 10*3/mm3      Eosinophils, Absolute 0.00 10*3/mm3      Basophils, Absolute 0.03 10*3/mm3      Immature Grans, Absolute 0.16 10*3/mm3      nRBC 0.0 /100 WBC     [place] Draw [[id number]] Collected: 07/08/24 1459    Specimen: Blood Updated: 07/08/24 1515    Narrative:      The following orders were created for panel order [place] Draw.  Procedure                               Abnormality         Status                     ---------                               -----------         ------                     Green Top (Gel)[[id number]]                                  Final result               Lavender Top[[id number]]                                     Final result               Gold Top - SST[[id number]]                                   Final result               Light Blue Top[[id number]]                                   Final result                 Please view results for these tests on the individual orders.    Green Top (Gel) [[id number]] Collected: 07/08/24 1459    Specimen: Blood Updated: 07/08/24 1515     Extra Tube Hold for add-ons.     Comment: Auto resulted.       Lavender Top [[id number]] Collected: 07/08/24 1459    Specimen: Blood Updated: 07/08/24 1515     Extra Tube hold for add-on     Comment: Auto resulted       Gold Top - SST [[id number]] Collected: 07/08/24 1459    Specimen: Blood Updated: 07/08/24 1515     Extra Tube Hold for add-ons.     Comment: Auto resulted.       Light Blue Top [[id number]] Collected: 07/08/24 1459    Specimen: Blood Updated: 07/08/24 1515     Extra Tube Hold for add-ons.     Comment: Auto  resulted                I reviewed the patient's new clinical results.    Assessment & Plan       Right hip pain  - difficulty bearing weight; questionable abnormality on ct scan.  MRI is pending.  Keep NWB until further testing completed.    Right fibula fracture - anticipate conservative management; ice, elevate, will need walking boot once swelling reduces    Altered mental status - concerning for possible UTI or other infection, hepatic encephalopathy, substance use, NPH, SHD, etc.  Head ct pending.  UA, urine tox screen pending.  Check ammonia level.    Leukocytosis - checking UA, respiratory panel, chest xray, blood cultures    Chronic insomnia - holding Ambien given altered mental status    DM-II - consistent carb diet; check A1C    CKD stage 3 - creatinine is at baseline; avoid nephrotoxins    Scd's for dvt prophy  Famotidine for stress ulcer prophy            I discussed the patient's findings and my recommendations with patient.     Sonia Valadez MD  07/08/24  18:32 EDT

## 2024-07-08 NOTE — Clinical Note
Level of Care: Med/Surg [1]   Admitting Physician: LENO MIRANDA [[id number]]   Attending Physician: LENO MIRANDA [[id number]]

## 2024-07-08 NOTE — LETTER
EMS Transport Request  For use at [facility], [place], [place], [place], and [place] only   Patient Name: Yuan Salas : 1945   Weight:82.4 kg (181 lb 10.5 oz) Pick-up Location: [facility] BLS/ALS: BLS/ALS: BLS   Insurance: [employer] MEDICARE REPLACEMENT Auth End Date: 24   Pre-Cert #: D/C Summary complete:    Destination: Other [facility]    Contact Precautions: None   Equipment (O2, Fluids, etc.): None   Arrive By Date/Time: 2024 Stretcher/WC: Wheelchair   CM Requesting: Edda Guillaume RN Ext: [phone]   Notes/Medical Necessity: Family and Facility cannot transport.     ______________________________________________________________________    *Only 2 patient bags OR 1 carry-on size bag are permitted.  Wheelchairs and walkers CANNOT transported with the patient. Acknowledge: Yes

## 2024-07-09 ENCOUNTER — APPOINTMENT (OUTPATIENT)
Dept: GENERAL RADIOLOGY | Facility: HOSPITAL | Age: 79
End: 2024-07-09
Payer: MEDICARE

## 2024-07-09 ENCOUNTER — APPOINTMENT (OUTPATIENT)
Dept: CT IMAGING | Facility: HOSPITAL | Age: 79
End: 2024-07-09
Payer: MEDICARE

## 2024-07-09 LAB
AMPHET+METHAMPHET UR QL: NEGATIVE
BARBITURATES UR QL SCN: NEGATIVE
BENZODIAZ UR QL SCN: NEGATIVE
BILIRUB UR QL STRIP: NEGATIVE
CANNABINOIDS SERPL QL: NEGATIVE
CLARITY UR: CLEAR
COCAINE UR QL: NEGATIVE
COLOR UR: YELLOW
GLUCOSE UR STRIP-MCNC: ABNORMAL MG/DL
HGB UR QL STRIP.AUTO: NEGATIVE
KETONES UR QL STRIP: NEGATIVE
LEUKOCYTE ESTERASE UR QL STRIP.AUTO: NEGATIVE
METHADONE UR QL SCN: NEGATIVE
NITRITE UR QL STRIP: NEGATIVE
OPIATES UR QL: POSITIVE
OXYCODONE UR QL SCN: NEGATIVE
PH UR STRIP.AUTO: <=5 [PH] (ref 5–8)
PROT UR QL STRIP: ABNORMAL
SP GR UR STRIP: 1.03 (ref 1–1.03)
UROBILINOGEN UR QL STRIP: ABNORMAL

## 2024-07-09 PROCEDURE — 97162 PT EVAL MOD COMPLEX 30 MIN: CPT

## 2024-07-09 PROCEDURE — 97166 OT EVAL MOD COMPLEX 45 MIN: CPT

## 2024-07-09 PROCEDURE — 80307 DRUG TEST PRSMV CHEM ANLYZR: CPT | Performed by: INTERNAL MEDICINE

## 2024-07-09 PROCEDURE — 70450 CT HEAD/BRAIN W/O DYE: CPT

## 2024-07-09 PROCEDURE — 73610 X-RAY EXAM OF ANKLE: CPT

## 2024-07-09 PROCEDURE — G0378 HOSPITAL OBSERVATION PER HR: HCPCS

## 2024-07-09 PROCEDURE — 81003 URINALYSIS AUTO W/O SCOPE: CPT | Performed by: INTERNAL MEDICINE

## 2024-07-09 RX ADMIN — Medication 10 ML: at 08:06

## 2024-07-09 RX ADMIN — HYDROCODONE BITARTRATE AND ACETAMINOPHEN 1 TABLET: 7.5; 325 TABLET ORAL at 21:02

## 2024-07-09 RX ADMIN — FAMOTIDINE 40 MG: 20 TABLET, FILM COATED ORAL at 08:05

## 2024-07-09 RX ADMIN — HYDROCODONE BITARTRATE AND ACETAMINOPHEN 1 TABLET: 7.5; 325 TABLET ORAL at 16:20

## 2024-07-09 RX ADMIN — HYDROCODONE BITARTRATE AND ACETAMINOPHEN 1 TABLET: 7.5; 325 TABLET ORAL at 07:54

## 2024-07-09 RX ADMIN — HYDROCODONE BITARTRATE AND ACETAMINOPHEN 1 TABLET: 7.5; 325 TABLET ORAL at 12:35

## 2024-07-09 RX ADMIN — HYDROCODONE BITARTRATE AND ACETAMINOPHEN 1 TABLET: 7.5; 325 TABLET ORAL at 03:17

## 2024-07-09 RX ADMIN — Medication 10 ML: at 20:52

## 2024-07-09 NOTE — CASE MANAGEMENT/SOCIAL WORK
Discharge Planning Assessment   Anastacio     Patient Name: Yuan Salas  MRN: [id number]  Today's Date: 7/9/2024    Admit Date: 7/8/2024    Plan: DC PLAN: Referral to [facility], awaiting response. Will need Precert and PASRR. May need transport at [place].     Discharge Needs Assessment       Row Name 07/09/24 1433       Living Environment    People in Home alone    Current Living Arrangements home    Potentially Unsafe Housing Conditions none    In the past 12 months has the electric, gas, oil, or water company threatened to shut off services in your home? No    Primary Care Provided by self    Provides Primary Care For no one    Quality of Family Relationships helpful;involved;supportive    Able to Return to Prior Arrangements yes       Resource/Environmental Concerns    Resource/Environmental Concerns none    Transportation Concerns none       Transportation Needs    In the past 12 months, has lack of transportation kept you from medical appointments or from getting medications? no    In the past 12 months, has lack of transportation kept you from meetings, work, or from getting things needed for daily living? No       Food Insecurity    Within the past 12 months, you worried that your food would run out before you got the money to buy more. Never true    Within the past 12 months, the food you bought just didn't last and you didn't have money to get more. Never true       Transition Planning    Patient/Family Anticipates Transition to home    Patient/Family Anticipated Services at Transition none    Transportation Anticipated car, drives self;family or friend will provide       Discharge Needs Assessment    Readmission Within the Last 30 Days no previous admission in last 30 days    Equipment Currently Used at Home none    Anticipated Changes Related to Illness none    Equipment Needed After Discharge none                   Discharge Plan       Row Name 07/09/24 1433       Plan    Plan DC PLAN: Referral to  [facility], awaiting response. Will need Precert and PASRR. May need transport at [place].    Patient/Family in Agreement with Plan yes    Plan Comments Met with patient at bedside, from home alone. Prior to this was independent with ADL's. Now is Non weightbearing and below baseline. PCP is [facility] and Pharmacy is [facility]. Able to afford medications and denies any issues with food or utilities. Denies any transportation issues, other than cannot drive right now. Family to provide. PT recommends INPT/SNF. Patient agreeable, list provided. DCP report sent to [facility] and message sent to Tory with [facility] per patient request. Will need Precert and PASRR. CM to follow.                  Continued Care and Services - Admitted Since 7/8/2024       Destination       Service Provider Request Status Selected Services Address Phone Fax Patient Preferred    [facility] Pending - Request Sent N/A [id number] [facility] [place] [address] [phone] --                  Expected Discharge Date and Time       Expected Discharge Date Expected Discharge Time    Jul 10, 2024            Demographic Summary       Row Name 07/09/24 1432       General Information    Admission Type observation    Arrived From emergency department    Required Notices Provided Observation Status Notice    Referral Source admission list    Reason for Consult discharge planning    Preferred Language English       Contact Information    Permission Granted to Share Info With     Contact Information Obtained for                    Functional Status       Row Name 07/09/24 1432       Functional Status    Usual Activity Tolerance excellent    Current Activity Tolerance excellent       Physical Activity    On average, how many days per week do you engage in moderate to strenuous exercise (like a brisk walk)? 0 days    On average, how many minutes do you engage in exercise at this level? 0 min     Number of minutes of exercise per week 0       Assessment of Health Literacy    How often do you have someone help you read hospital materials? Sometimes    How often do you have problems learning about your medical condition because of difficulty understanding written information? Sometimes    How often do you have a problem understanding what is told to you about your medical condition? Sometimes    How confident are you filling out medical forms by yourself? Somewhat    Health Literacy Moderate       Functional Status, IADL    Medications independent    Meal Preparation independent    Housekeeping independent    Laundry independent    Shopping independent       Mental Status    General Appearance WDL WDL       Mental Status Summary    Recent Changes in Mental Status/Cognitive Functioning no changes                    Met with patient in room wearing PPE:    Maintained distance greater than six feet and spent less than 15 minutes in the room.    Edda Guillaume RN   Case Management

## 2024-07-09 NOTE — DISCHARGE PLACEMENT REQUEST
"Vinh Rich (79 y.o. Male)       Date of Birth   1945    Social Security Number       Address   [address] [place] [address] [address] [place] [address] [place] [facility]    Home Phone   [phone]    MRN   [id number]       Buddhist   Unknown    Marital Status   Unknown                            Admission Date   7/8/24    Admission Type   Emergency    Admitting Provider   Sonia Valadez MD    Attending Provider   Sonia Valadez MD    Department, Room/Bed   [facility] SURGICAL INPATIENT, 4123/1       Discharge Date       Discharge Disposition       Discharge Destination                                 Attending Provider: Sonia Valadez MD    Allergies: No Known Allergies    Isolation: None   Infection: None   Code Status: CPR    Ht: 172.7 cm (68\")   Wt: 82.4 kg (181 lb 10.5 oz)    Admission Cmt: None   Principal Problem: Right hip pain [M25.551]                   Active Insurance as of 7/8/2024       Primary Coverage       Payor Plan Insurance Group Employer/Plan Group    [facility] MEDICARE REPLACEMENT AARP MED ADV HMO [id number]       Payor Plan Address Payor Plan Phone Number Payor Plan Fax Number Effective Dates    [address]   3/1/2024 - None Entered    [facility] [id number]         Subscriber Name Subscriber Birth Date Member ID       VINH RICH 1945 [id number]                     Emergency Contacts        (Rel.) Home Phone Work Phone Mobile Phone    Lacie Bautista (Friend) -- -- [phone]            "

## 2024-07-09 NOTE — PLAN OF CARE
Goal Outcome Evaluation:  Plan of Care Reviewed With: patient        Progress: no change  Outcome Evaluation: Pt is a 78 y/o M admitted to [facility] 7/8/24 with c/o R hip pain, noted to have x2 falls prior to admission. Ortho following: recommending RLE NWB CAM boot 6 weeks. At baseline pt resides in ground level apartment, [address]. Pt typically (I) with ADLs, (I) with mobility with RW, also has cane if needed. Pt is an active . Pt has limited social support. This date pt A&Ox4 on RA sitting up in bed upon arrival, minimal pain with mobility. Pt educated on RLE NWB with CAM boot, verbalized understanding however does require significant verbal cues throughout to adhere to. Pt requires SBA for bed mobility, verbal cues to adhere to WBS, avoid pushing through RLE with bed mobility. Pt comes to standing with mod A x2 with RW and requires mod A x2 to transfer from bed to chair with RW. Pt demo difficulty adhering to WBS upon initial stand, therefore chair was placed closer to bed to complete SPT, with PT placing foot under pt CAM boot to monitor NWB, continues to require mod A x2 with RW. Pt demo poor follow through with WBS, poor insight to deficits and poor problem solving. Pt is at significantly high risk for falls and is not safe to dc home at this time. OT recommend acute IP rehab when medically appropriate for dc, likely to progress well with transfer training, AE, and compensatory strategies to increase (I) to max potential. Pt noted to have limited support system, high risk for re-admission if dc home. OT will follow within acute setting.      Anticipated Discharge Disposition (OT): inpatient rehabilitation facility

## 2024-07-09 NOTE — PAYOR COMM NOTE
"Vinh Rich (79 y.o. Male)       Date of Birth   1945    Social Security Number       Address   [address] [place] [address] [address] [place] [facility]    Home Phone   [phone]    MRN   [id number]       Roman Catholic   Unknown    Marital Status   Unknown                            Admission Date   7/8/24    Admission Type   Emergency    Admitting Provider   Sonia Valadez MD    Attending Provider   Sonia Valadez MD    Department, Room/Bed   [facility] SURGICAL INPATIENT, 4123/1       Discharge Date       Discharge Disposition       Discharge Destination                                 Attending Provider: Sonia Valadez MD    Allergies: No Known Allergies    Isolation: None   Infection: None   Code Status: CPR    Ht: 172.7 cm (68\")   Wt: 82.4 kg (181 lb 10.5 oz)    Admission Cmt: None   Principal Problem: Right hip pain [M25.551]                   Active Insurance as of 7/8/2024       Primary Coverage       Payor Plan Insurance Group Employer/Plan Group    [facility] MEDICARE REPLACEMENT AARP MED ADV HMO [id number]       Payor Plan Address Payor Plan Phone Number Payor Plan Fax Number Effective Dates    [address]   3/1/2024 - None Entered    [facility] [id number]         Subscriber Name Subscriber Birth Date Member ID       VINH RICH 1945 [id number]                     Emergency Contacts        (Rel.) Home Phone Work Phone Mobile Phone    Lacie Bautista (Friend) -- -- [phone]                 History & Physical        Sonia Valadez MD at 07/08/24 1832              Patient Care Team:  Sonia Valadez MD as PCP - General (Internal Medicine)    Chief complaint Right hip and ankle pain    Subjective    Patient is a 79 y.o. male with h/o CKD stage 3, diabetes, chronic insomnia with dependence on Ambien who presents with 2 week history of right hip pain with frequent falls.  He is confused and can not provide details, other than telling me has fallen several times.  A family friend " provides additional history that at least twice he has fallen and has required assistance to get up off the floor.  He was evaluated recently at urgent care for right hip pain and had normal xray.  He was given steroids and muscle relaxer without significant improvement.  Last night he fell again and injured his right ankle and is unable to bear weight.  He has declined over the last few months since the death of his dog with deteriorating memory, no exercise and eating only fast food.  He sleeps most of the day and stays awake at night.      In the ER, ct of hip suggests a possible nondisplaced femoral neck fracture.  Right ankle films show a distal fibular fracture.  WBC count is elevated.    Review of Systems   Constitutional:  Positive for activity change and fatigue. Negative for appetite change, chills, diaphoresis, fever and unexpected weight change.   HENT:  Negative for facial swelling.    Eyes:  Negative for visual disturbance.   Respiratory:  Negative for cough, shortness of breath, wheezing and stridor.    Cardiovascular:  Negative for chest pain, palpitations and leg swelling.   Gastrointestinal:  Negative for abdominal pain, constipation, diarrhea, nausea and vomiting.   Genitourinary:  Negative for enuresis, flank pain, frequency and urgency.   Musculoskeletal:  Positive for arthralgias, gait problem and myalgias. Negative for neck pain and neck stiffness.   Skin:  Positive for wound. Negative for rash.   Neurological:  Negative for weakness.   Psychiatric/Behavioral:  Positive for confusion.           History  History reviewed. No pertinent past medical history.  History reviewed. No pertinent surgical history.  History reviewed. No pertinent family history.     (Not in a hospital admission)    Allergies:  Patient has no known allergies.    Objective    Vital Signs  Temp:  [97.8 °F (36.6 °C)] 97.8 °F (36.6 °C)  Heart Rate:  [79-96] 79  Resp:  [18] 18  BP: (129-151)/(61-94) 129/61     Physical  Exam:      General Appearance:    Alert, cooperative, in no acute distress   Head:    Normocephalic, without obvious abnormality, atraumatic   Eyes:            Lids and lashes normal, conjunctivae and sclerae normal, no   icterus, no pallor, corneas clear, PERRLA   Ears:    Ears appear intact with no abnormalities noted   Throat:   No oral lesions, no thrush, oral mucosa moist   Neck:   No adenopathy, supple, trachea midline, no thyromegaly, no   carotid bruit, no JVD   Lungs:     Clear to auscultation,respirations regular, even and                  unlabored    Heart:    Irregular   Chest Wall:    No abnormalities observed   Abdomen:     Normal bowel sounds, no masses, no organomegaly, soft        non-tender, non-distended, no guarding, no rebound                tenderness   Extremities:   Right foot - marked STS, ecchymosis and tenderness of lateral malleolus ; limited ROM in right hip with IR/ER, no specific pain elicited with IR/ER   Pulses:   Pulses palpable and equal bilaterally   Skin:   Scattered superficial ecchymoses of varying ages on all 4 extremities   Lymph nodes:   No palpable adenopathy   Neurologic:   Oriented to person, place, year; not month; appears dazed.  No focal neurologic deficits.       Results Review:     Imaging Results (Last 24 Hours)       Procedure Component Value Units Date/Time    CT Lower Extremity Right Without Contrast [[id number]] Collected: 07/08/24 1646     Updated: 07/08/24 1653    Narrative:      CT LOWER EXTREMITY RIGHT WO CONTRAST    Date of Exam: 7/8/2024 4:42 PM EDT    Indication: right hip pain/ falls neg xray.    Comparison: None available.    Technique: Axial CT images were obtained of the right lower extremity without contrast administration.  Sagittal and coronal reconstructions were performed.  Automated exposure control and iterative reconstruction methods were used.      Findings:  Osteopenia limits evaluation for nondisplaced fractures. If concern for  radiographically occult fracture MRI study of choice. There is questionable nondisplaced fracture at the femoral neck best seen on the axial image #158. Avascular channel cannot be   excluded. MRI of the hip is recommended if patient has difficulty weightbearing or pain.    There is moderate degenerative change of the hip joint. There are no aggressive osseous lesions.    No significant joint effusion is seen on CT. There are vascular calcifications. There are diverticuli in the colon.      Impression:      Impression:  CT of the hip is nondiagnostic for hip fracture. There is a questionable nondisplaced fracture of the femoral neck could be seen with nondisplaced fracture or vascular groove. If patient has difficulty weightbearing or pain, MRI of the hip is recommended   for better evaluation.  Osteopenia limits evaluation.  Moderate arthritis of the hip.        Electronically Signed: Oralia Tanner MD    7/8/2024 4:50 PM EDT    Workstation ID: [id number]    XR Ankle 3+ View Right [[id number]] Collected: 07/08/24 1526     Updated: 07/08/24 1530    Narrative:      XR ANKLE 3+ VW RIGHT    Date of Exam: 7/8/2024 3:18 PM EDT    Indication: pain after falling    Comparison: None available.    Findings:  Minimally displaced fracture through the distal fibula/lateral malleolus. The remainder of the osseous structures are intact.    The ankle mortise is intact.  Mild degenerative changes of the ankle.    Soft tissue edema surrounding the ankle.      Impression:      Impression:  Minimally displaced fracture through the distal fibula/lateral malleolus.      Electronically Signed: Walker Farfan DO    7/8/2024 3:28 PM EDT    Workstation ID: [id number]             Lab Results (last 24 hours)       Procedure Component Value Units Date/Time    COVID-19,CEPHEID/SELMA,COR/ANASTASIA/PAD/MITESH/LAG/MARY IN-HOUSE,NP SWAB IN TRANSPORT MEDIA 1 HR TAT, RT-PCR - Swab, Nasopharynx [[id number]] Collected: 07/08/24 1801    Specimen: Swab from Nasopharynx  Updated: 07/08/24 1804    Comprehensive Metabolic Panel [[id number]]  (Abnormal) Collected: 07/08/24 1459    Specimen: Blood Updated: 07/08/24 1751     Glucose 141 mg/dL      BUN 33 mg/dL      Creatinine 1.87 mg/dL      Sodium 141 mmol/L      Potassium 3.7 mmol/L      Chloride 104 mmol/L      CO2 21.1 mmol/L      Calcium 9.7 mg/dL      Total Protein 7.7 g/dL      Albumin 4.6 g/dL      ALT (SGPT) 26 U/L      AST (SGOT) 26 U/L      Alkaline Phosphatase 134 U/L      Total Bilirubin 0.5 mg/dL      Globulin 3.1 gm/dL      A/G Ratio 1.5 g/dL      BUN/Creatinine Ratio 17.6     Anion Gap 15.9 mmol/L      eGFR 36.3 mL/min/1.73     Narrative:      GFR Normal >60  Chronic Kidney Disease <60  Kidney Failure <15    The GFR formula is only valid for adults with stable renal function between ages 18 and 70.    CBC & Differential [[id number]]  (Abnormal) Collected: 07/08/24 1459    Specimen: Blood Updated: 07/08/24 1710    Narrative:      The following orders were created for panel order CBC & Differential.  Procedure                               Abnormality         Status                     ---------                               -----------         ------                     CBC Auto Differential[[id number]]        Abnormal            Final result                 Please view results for these tests on the individual orders.    CBC Auto Differential [[id number]]  (Abnormal) Collected: 07/08/24 1459    Specimen: Blood Updated: 07/08/24 1710     WBC 20.35 10*3/mm3      RBC 4.77 10*6/mm3      Hemoglobin 15.2 g/dL      Hematocrit 45.9 %      MCV 96.2 fL      MCH 31.9 pg      MCHC 33.1 g/dL      RDW 16.0 %      RDW-SD 57.6 fl      MPV 10.7 fL      Platelets 469 10*3/mm3      Neutrophil % 86.5 %      Lymphocyte % 4.3 %      Monocyte % 8.3 %      Eosinophil % 0.0 %      Basophil % 0.1 %      Immature Grans % 0.8 %      Neutrophils, Absolute 17.59 10*3/mm3      Lymphocytes, Absolute 0.88 10*3/mm3      Monocytes, Absolute 1.69 10*3/mm3       Eosinophils, Absolute 0.00 10*3/mm3      Basophils, Absolute 0.03 10*3/mm3      Immature Grans, Absolute 0.16 10*3/mm3      nRBC 0.0 /100 WBC     [place] Draw [[id number]] Collected: 07/08/24 1459    Specimen: Blood Updated: 07/08/24 1515    Narrative:      The following orders were created for panel order [place] Draw.  Procedure                               Abnormality         Status                     ---------                               -----------         ------                     Green Top (Gel)[[id number]]                                  Final result               Lavender Top[[id number]]                                     Final result               Gold Top - SST[[id number]]                                   Final result               Light Blue Top[[id number]]                                   Final result                 Please view results for these tests on the individual orders.    Green Top (Gel) [[id number]] Collected: 07/08/24 1459    Specimen: Blood Updated: 07/08/24 1515     Extra Tube Hold for add-ons.     Comment: Auto resulted.       Lavender Top [[id number]] Collected: 07/08/24 1459    Specimen: Blood Updated: 07/08/24 1515     Extra Tube hold for add-on     Comment: Auto resulted       Gold Top - SST [[id number]] Collected: 07/08/24 1459    Specimen: Blood Updated: 07/08/24 1515     Extra Tube Hold for add-ons.     Comment: Auto resulted.       Light Blue Top [[id number]] Collected: 07/08/24 1459    Specimen: Blood Updated: 07/08/24 1515     Extra Tube Hold for add-ons.     Comment: Auto resulted                I reviewed the patient's new clinical results.    Assessment & Plan      Right hip pain  - difficulty bearing weight; questionable abnormality on ct scan.  MRI is pending.  Keep NWB until further testing completed.    Right fibula fracture - anticipate conservative management; ice, elevate, will need walking boot once swelling reduces    Altered mental status - concerning for possible  UTI or other infection, hepatic encephalopathy, substance use, NPH, SHD, etc.  Head ct pending.  UA, urine tox screen pending.  Check ammonia level.    Leukocytosis - checking UA, respiratory panel, chest xray, blood cultures    Chronic insomnia - holding Ambien given altered mental status    DM-II - consistent carb diet; check A1C    CKD stage 3 - creatinine is at baseline; avoid nephrotoxins    Scd's for dvt prophy  Famotidine for stress ulcer prophy            I discussed the patient's findings and my recommendations with patient.     Sonia Valadez MD  07/08/24  18:32 EDT        Electronically signed by Sonia Valadez MD at 07/08/24 1854          Emergency Department Notes        Norma Ledezma, LANI at 07/08/24 1453       Attestation signed by Andi Sultana MD at 07/09/24 0033        SUPERVISE: For this patient encounter, I reviewed the APC's documentation, treatment plan, and medical decision making.  Andi Sultana MD 7/9/2024 00:33 EDT                         Subjective     Provider in Triage Note  Due to significant overcrowding in the emergency department patient was initially seen and evaluated in triage.  Provider in triage recommended patient placement in the treatment area to initiate therapy and movement to an ER bed as soon as possible.    Patient presents with complaints of right hip and lower back pain.  Recently seen by [facility] a few days ago and diagnosed with sciatica. Given steroids and muscle relaxers with no improvement.  Fell twice today due to pain and weakness in right leg.  Injured right ankle.  No saddle anesthesia.  No bowel or bladder incontinence.      History of Present Illness  I reviewed the provider in triage note and attest this is the HPI    Patient was seen on July 5 at [facility] urgent care and had an x-ray which was read as no immediate fracture he was referred to Dr. Faria the neurosurgeon for further evaluation but he has not seen them he was given a Decadron 10 mg IM  injection at that time.  Pain has increased and he had another fall this morning  He lives at home alone        Review of Systems   Musculoskeletal:         Right hip pain  Right ankle pain and swelling   Skin:  Positive for color change.        Right ankle         History reviewed. No pertinent past medical history.    No Known Allergies    History reviewed. No pertinent surgical history.    History reviewed. No pertinent family history.    Social History     Socioeconomic History    Marital status: Unknown           Objective   Physical Exam  Vitals reviewed.   Constitutional:       Appearance: He is well-developed.   HENT:      Head: Normocephalic and atraumatic.   Eyes:      Conjunctiva/sclera: Conjunctivae normal.      Pupils: Pupils are equal, round, and reactive to light.   Cardiovascular:      Rate and Rhythm: Normal rate and regular rhythm.      Heart sounds: Normal heart sounds.   Pulmonary:      Effort: Pulmonary effort is normal.      Breath sounds: Normal breath sounds.   Abdominal:      General: Bowel sounds are normal.      Palpations: Abdomen is soft.   Musculoskeletal:         General: Normal range of motion.      Cervical back: Normal, normal range of motion and neck supple.      Thoracic back: Normal.      Lumbar back: Normal.      Right hip: Tenderness present. Decreased range of motion. Decreased strength.      Left hip: Normal.        Legs:    Skin:     General: Skin is warm and dry.      Capillary Refill: Capillary refill takes 2 to 3 seconds.   Neurological:      General: No focal deficit present.      Mental Status: He is alert and oriented to person, place, and time.      GCS: GCS eye subscore is 4. GCS verbal subscore is 5. GCS motor subscore is 6.         Procedures      CAM Walker placed on the right foot the patient was distally neurovascular intact after placement    ED Course  ED Course as of 07/08/24 1731   Mon Jul 08, 2024   1544 Marked ready for CT [KW]   1700 Spoke to Dr. Valadez  "who agreed to admit [KW]   1715 Dr. Valadez at bedside [KW]      ED Course User Index  [KW] Norma Ledezma, APRN                                 /73   Pulse 92   Temp 97.8 °F (36.6 °C) (Oral)   Resp 18   Ht 177.8 cm (70\")   Wt 77.1 kg (170 lb)   SpO2 92%   BMI 24.39 kg/m²   Labs Reviewed   CBC WITH AUTO DIFFERENTIAL - Abnormal; Notable for the following components:       Result Value    WBC 20.35 (*)     RDW 16.0 (*)     RDW-SD 57.6 (*)     Platelets 469 (*)     Neutrophil % 86.5 (*)     Lymphocyte % 4.3 (*)     Eosinophil % 0.0 (*)     Immature Grans % 0.8 (*)     Neutrophils, Absolute 17.59 (*)     Monocytes, Absolute 1.69 (*)     Immature Grans, Absolute 0.16 (*)     All other components within normal limits   RAINBOW DRAW    Narrative:     The following orders were created for panel order [place] Draw.  Procedure                               Abnormality         Status                     ---------                               -----------         ------                     Green Top (Gel)[[id number]]                                  Final result               Lavender Top[[id number]]                                     Final result               Gold Top - SST[[id number]]                                   Final result               Light Blue Top[[id number]]                                   Final result                 Please view results for these tests on the individual orders.   COMPREHENSIVE METABOLIC PANEL   GREEN TOP   LAVENDER TOP   GOLD TOP - SST   LIGHT BLUE TOP   CBC AND DIFFERENTIAL    Narrative:     The following orders were created for panel order CBC & Differential.  Procedure                               Abnormality         Status                     ---------                               -----------         ------                     CBC Auto Differential[[id number]]        Abnormal            Final result                 Please view results for these tests on the individual " orders.     Medications   sodium chloride 0.9 % flush 10 mL (has no administration in time range)   sodium chloride 0.9 % flush 10 mL (has no administration in time range)   Lidocaine 4 % 1 patch (1 patch Transdermal Medication Applied 7/8/24 1548)   sodium chloride 0.9 % flush 10 mL (has no administration in time range)   methocarbamol (ROBAXIN) tablet 500 mg (500 mg Oral Given 7/8/24 1549)   HYDROcodone-acetaminophen (NORCO) 7.5-325 MG per tablet 1 tablet (1 tablet Oral Given 7/8/24 1549)   ondansetron ODT (ZOFRAN-ODT) disintegrating tablet 4 mg (4 mg Oral Given 7/8/24 1549)     CT Lower Extremity Right Without Contrast    Result Date: 7/8/2024  Impression: CT of the hip is nondiagnostic for hip fracture. There is a questionable nondisplaced fracture of the femoral neck could be seen with nondisplaced fracture or vascular groove. If patient has difficulty weightbearing or pain, MRI of the hip is recommended  for better evaluation. Osteopenia limits evaluation. Moderate arthritis of the hip. Electronically Signed: Oralia Tanner MD  7/8/2024 4:50 PM EDT  Workstation ID: [id number]    XR Ankle 3+ View Right    Result Date: 7/8/2024  Impression: Minimally displaced fracture through the distal fibula/lateral malleolus. Electronically Signed: Walker Farfan DO  7/8/2024 3:28 PM EDT  Workstation ID: [id number]               Medical Decision Making  Patient is a 79-year-old gentleman who has had multiple falls with complaints of right hip pain that is worsening since he was seen at urgent care 3 days ago-he also complains of right ankle pain and swelling patient had above exam and x-rays of the hip had been done at the urgent care so I did a CT which was interpreted by the radiologist as having a possible nondisplaced fracture which is really inconclusive and states that an MRI might be better suited.  The patient also had an x-ray of the right ankle which shows a nondisplaced fracture of the distal fibula this was placed  in a cam walker and the patient was distally neurovascularly intact after placement.  The results were discussed with Dr. Miranda who agreed to admit as the patient needs physical therapy consultation and the possibility of rehabilitation.  The patient was agreeable this plan of care.    Problems Addressed:  Acute hip pain, right: complicated acute illness or injury  Multiple falls: complicated acute illness or injury    Amount and/or Complexity of Data Reviewed  Independent Historian: caregiver     Details: Female friend at bedside  External Data Reviewed: radiology and notes.     Details: From his urgent care visit 3 days ago  Labs: ordered. Decision-making details documented in ED Course.  Radiology: ordered and independent interpretation performed. Decision-making details documented in ED Course.  ECG/medicine tests: ordered and independent interpretation performed. Decision-making details documented in ED Course.    Risk  OTC drugs.  Prescription drug management.  Decision regarding hospitalization.        Final diagnoses:   Acute hip pain, right   Multiple falls   Closed nondisplaced fracture of lateral malleolus of right fibula, initial encounter       ED Disposition  ED Disposition       ED Disposition   Decision to Admit    Condition   --    Comment   Level of Care: Med/Surg [1]   Admitting Physician: LENO MIRANDA [[id number]]   Attending Physician: LENO MIRANDA [[id number]]                 No follow-up provider specified.       Medication List      No changes were made to your prescriptions during this visit.            Norma Ledezma, APRN  07/08/24 1731      Electronically signed by Andi Sultana MD at 07/09/24 0033       Operative/Procedure Notes (last 24 hours)  Notes from 07/08/24 0832 through 07/09/24 0832   No notes of this type exist for this encounter.       Physician Progress Notes (last 24 hours)  Notes from 07/08/24 0832 through 07/09/24 0832   No notes of this type exist for this encounter.           Consult Notes (last 24 hours)        Marianna Chiu MD at 07/09/24 0707        Consult Orders    1. Inpatient Orthopedic Surgery Consult [[id number]] ordered by Sonia Valadez MD at 07/08/24 1828                   Orthopaedic Consult    Marianna Chiu MD      Patient: Yuan Salas    Date of Admission: 7/8/2024  3:03 PM    YOB: 1945    Medical Record Number: [id number]    Attending Physician: Sonia Valadez MD  Consulting Physician: Marianna Chiu MD      Chief Complaints: Right hip pain [M25.551]  Multiple falls [R29.6]  Closed nondisplaced fracture of lateral malleolus of right fibula, initial encounter [S82.64XA]  Acute hip pain, right [M25.551]      History of Present Illness: 79 y.o. male admitted to [facility] with Right hip pain [M25.551]  Multiple falls [R29.6]  Closed nondisplaced fracture of lateral malleolus of right fibula, initial encounter [S82.64XA]  Acute hip pain, right [M25.551]. I was consulted for further evaluation and treatment. Onset of symptoms was yesterday.  He lives alone.  He reports he fell going to the bathroom and fell between the couch.  He landed on his right ankle and his right hip.  He believes most of the pain is sciatic and related of his hip.  He is complaining of pain in his ankle mostly on the lateral side.  He reports he was a diabetic but is diet controlled.  He denies use of tobacco products.  Overall he reports he is relatively healthy does not use an assistive device at home to walk.     Allergies: No Known Allergies    Medications:   Home Medications:  Medications Prior to Admission   Medication Sig Dispense Refill Last Dose    allopurinol (ZYLOPRIM) 100 MG tablet Take 1 tablet by mouth Daily.   7/7/2024    amLODIPine (NORVASC) 5 MG tablet Take 1 tablet by mouth Daily.   7/7/2024    atorvastatin (LIPITOR) 40 MG tablet Take 1 tablet by mouth Daily.   7/7/2024    dapagliflozin Propanediol (Farxiga) 10 MG tablet Take 10 mg by mouth Daily.    7/8/2024    FLUoxetine (PROzac) 40 MG capsule Take 1 capsule by mouth Daily.   7/8/2024    levothyroxine (SYNTHROID, LEVOTHROID) 50 MCG tablet Take 1 tablet by mouth Daily.   7/8/2024    pioglitazone (ACTOS) 30 MG tablet Take 1 tablet by mouth Daily.   7/8/2024    zolpidem (Ambien) 10 MG tablet Take 1 tablet by mouth At Night As Needed for Sleep.   7/7/2024    methocarbamol (ROBAXIN) 750 MG tablet Take 1 tablet by mouth 3 (Three) Times a Day As Needed for Muscle Spasms.          Current Medications:  Scheduled Meds:famotidine, 40 mg, Oral, Daily  sodium chloride, 10 mL, Intravenous, Q12H      Continuous Infusions:   PRN Meds:.  acetaminophen    senna-docusate sodium **AND** polyethylene glycol **AND** bisacodyl **AND** bisacodyl    Calcium Replacement - Follow Nurse / BPA Driven Protocol    HYDROcodone-acetaminophen    Magnesium Standard Dose Replacement - Follow Nurse / BPA Driven Protocol    ondansetron    Phosphorus Replacement - Follow Nurse / BPA Driven Protocol    Potassium Replacement - Follow Nurse / BPA Driven Protocol    [COMPLETED] Insert Peripheral IV **AND** sodium chloride    sodium chloride    [COMPLETED] Insert Peripheral IV **AND** sodium chloride    sodium chloride    sodium chloride    Past Medical History:   Diagnosis Date    Asthma     Chronic headaches     Diabetes mellitus     Disease of thyroid gland     Gout     Hyperlipidemia     Hypertension     Sleep apnea     does not use cpap or bipap     Past Surgical History:   Procedure Laterality Date    CATARACT EXTRACTION      RETINAL DETACHMENT SURGERY Bilateral      Social History     Occupational History    Not on file   Tobacco Use    Smoking status: Never    Smokeless tobacco: Never   Vaping Use    Vaping status: Never Used   Substance and Sexual Activity    Alcohol use: Never    Drug use: Never    Sexual activity: Not on file      Social History     Social History Narrative    Not on file     History reviewed. No pertinent family  "history.      Review of Systems:   Constitutional: Negative for fatigue, fever, or weight loss  HEENT: Patient denies any headaches, vision changes, sore throat. Admits to wearing glasses  Pulmonary: Patient denies any cough, congestion, SOA, or wheezing  Cardiovascular: Patient denies any chest pain, palpitations, weakness,  Gastrointestinal:  Patient denies nausea, vomiting, diarrhea, constipation  Genital/Urinary: Patient denies dysuria, urinary frequency, urgency, incontinence, retention  Musculoskeletal: Positive for right hip pain mostly sciatic in nature and ankle pain. Negative for muscle cramps  Neurological: Patient denies dizziness, paresthesia, loss of sensation, seizures, syncope  Endocrine system: Patient denies tremors, cold or heat intolerance  Psychological: Patient denies thoughts/plans or harming self or other; hallucinations,  insomnia  Skin: Patient denies any bruising, rashes, lesions, or ulcers   Hematopoietic: Patient denies history of MRSA or slow wound healing,  spontaneous or excessive bleeding    Physical Exam: 79 y.o. male  Vitals:    07/08/24 1802 07/08/24 2102 07/09/24 0035 07/09/24 0428   BP: 129/61 122/70 127/71 121/74   BP Location:  Right arm Right arm Right arm   Patient Position:  Lying Lying Lying   Pulse: 79 89 94 85   Resp: 18 18 18 16   Temp:  97.6 °F (36.4 °C) 97.6 °F (36.4 °C) 97.5 °F (36.4 °C)   TempSrc:  Oral Oral Oral   SpO2: 91% 93% 98% 91%   Weight:  82.4 kg (181 lb 10.5 oz)     Height:  172.7 cm (68\")                              General Appearance:  Alert, cooperative, in no acute distress    HEENT:    Normocephalic,  Atraumatic, Pupils are equal   Neck:   No adenopathy, supple, trachea midline, no thyromegaly       Lungs:     Clear to auscultation, equal expansion bilaterally, respirations regular, even and               unlabored    Heart:    Regular rhythm and normal rate, normal S1 and S2, no murmur, no gallops   Chest Wall:    Within normal limits   Abdomen:     " Normal bowel sounds, no masses,  soft non-tender, non-distended,       Rectal:  Musculoskeletal:     Deferred  He is nontender with a logroll of the right lower extremity.  He is able to flex and extend through the hip.  He is nontender over the greater trochanter.  He reports most of the pain is located in the back of his hip radiating down his leg.  He is tender palpation over his lateral ankle there is appropriate ecchymoses.  He is able to flex and extend all of his toes neuro intact   Extremities:   No clubbing, no edema, no cyanosis   Pulses  Neurovascular:   Pulses palpable and equal bilaterally in all 4 extremities    Patient was alert and oriented x 3 spheres   Skin:   No lesions, ulcers or rashes   Neurologic:   Cranial nerves 2 - 12 grossly intact, sensation intact            Diagnostic Tests:    I reviewed his CT and MRI of his right femur I do not personally appreciate any fractures.  I will wait to the final read of the radiologist is made.  I do believe this is okay for no management from orthopedic standpoint.  He is symptomatic from sciatica     3 views of his right ankle demonstrate that there is a mildly displaced lateral malleolus fracture.            Assessment:    Right hip pain    Right sciatic pain possible hip fracture  Right lateral malleolus fracture        Plan:      I will get x-rays of his ankle in the boot and follow-up on this.  This is very minimally displaced and could be amenable to nonoperative management which would be placement of a cast and nonweightbearing for a total of 6 weeks.  I will see him in clinic and place this cast.  Discussed with the patient if he does have movement of his lateral malleolus in the boot we would have a operative discussion.  Questions encouraged and answered no guarantees given.    Date: 7/9/2024  Marianna Chiu MD  Orthopaedic Surgeon    [facility]  [facility]icine  [phone]  [email or web address]                    Electronically signed by Marianna Chiu MD at 07/09/24 0225

## 2024-07-09 NOTE — PLAN OF CARE
Goal Outcome Evaluation:  Plan of Care Reviewed With: patient   Pt presents as a 78 y/o F admitted to [facility] on 7/8/24 with acute right leg pain. Imaging showed Right lateral malleolus fracture. He reports he fell going to the bathroom and fell between the couch. He landed on his right ankle and his right hip. Per ortho: nonoperative management which would be placement of a cast and nonweightbearing for a total of 6 weeks. MRI right hip was negative for fx. Pt A and O x 4. At baseline, pt lives alone in a ground floor apt with 0 YASMINE and he uses a rolling walker for household mobility; pt drives. Pt has a CAM boot in place RLE. Pt requiring mod assist of 2 for a stand-pivot-sit transfer with rolling walker from bed to recliner NWB RLE. PT kept her foot beneath pt's R CAM boot to monitor his NWB ability. Pt was unable to offload weight on to BUE to take hopping steps. Pt does not have coordination needed to use knee scooter. Pt will need a wheelchair for the next 6 weeks to maintain NWB RLE. Pt has no family or friends to assist him at home. PT recommendation is In-patient rehab Facility.                Anticipated Discharge Disposition (PT): inpatient rehabilitation facility

## 2024-07-09 NOTE — PROGRESS NOTES
LOS: 0 days   Patient Care Team:  Sonia Valadez MD as PCP - General (Internal Medicine)    Subjective     C/o of leg pain    Review of Systems   Constitutional:  Positive for activity change, appetite change and fatigue.   HENT: Negative.     Respiratory: Negative.     Cardiovascular:  Positive for leg swelling.   Gastrointestinal: Negative.    Genitourinary: Negative.    Musculoskeletal:  Positive for gait problem.   Neurological:  Positive for weakness.   Psychiatric/Behavioral: Negative.             Objective     Vital Signs  Temp:  [97.4 °F (36.3 °C)-97.8 °F (36.6 °C)] 97.4 °F (36.3 °C)  Heart Rate:  [65-96] 65  Resp:  [16-18] 16  BP: (117-151)/(61-94) 117/70      Physical Exam  Vitals reviewed.   Constitutional:       Appearance: He is not ill-appearing.   HENT:      Head: Normocephalic and atraumatic.      Right Ear: External ear normal.      Left Ear: External ear normal.      Nose: Nose normal.      Mouth/Throat:      Mouth: Mucous membranes are moist.   Eyes:      General:         Right eye: No discharge.         Left eye: No discharge.   Cardiovascular:      Rate and Rhythm: Normal rate and regular rhythm.      Pulses: Normal pulses.      Heart sounds: Normal heart sounds.   Pulmonary:      Effort: Pulmonary effort is normal.      Breath sounds: Normal breath sounds.   Abdominal:      General: Bowel sounds are normal.      Palpations: Abdomen is soft.   Musculoskeletal:         General: Normal range of motion.      Cervical back: Normal range of motion.   Skin:     General: Skin is warm and dry.   Neurological:      Mental Status: He is alert and oriented to person, place, and time.   Psychiatric:         Behavior: Behavior normal.              Results Review:    Lab Results (last 24 hours)       Procedure Component Value Units Date/Time    Urine Drug Screen - Urine, Clean Catch [[id number]]  (Abnormal) Collected: 07/09/24 0756    Specimen: Urine, Clean Catch Updated: 07/09/24 0899      Amphet/Methamphet, Screen Negative     Barbiturates Screen, Urine Negative     Benzodiazepine Screen, Urine Negative     Cocaine Screen, Urine Negative     Opiate Screen Positive     THC, Screen, Urine Negative     Methadone Screen, Urine Negative     Oxycodone Screen, Urine Negative    Narrative:      Negative Thresholds Per Drugs Screened:    Amphetamines                 500 ng/ml  Barbiturates                 200 ng/ml  Benzodiazepines              100 ng/ml  Cocaine                      300 ng/ml  Methadone                    300 ng/ml  Opiates                      300 ng/ml  Oxycodone                    100 ng/ml  THC                           50 ng/ml    The Normal Value for all drugs tested is negative. This report includes final unconfirmed screening results to be used for medical treatment purposes only. Unconfirmed results must not be used for non-medical purposes such as employment or legal testing. Clinical consideration should be applied to any drug of abuse test, particularly when unconfirmed results are used.          All urine drugs of abuse requests without chain of custody are for medical screening purposes only.  False positives are possible.      Urinalysis With Culture If Indicated - Urine, Clean Catch [[id number]]  (Abnormal) Collected: 07/09/24 0756    Specimen: Urine, Clean Catch Updated: 07/09/24 0808     Color, UA Yellow     Appearance, UA Clear     pH, UA <=5.0     Specific Gravity, UA 1.028     Glucose, UA >=1000 mg/dL (3+)     Ketones, UA Negative     Bilirubin, UA Negative     Blood, UA Negative     Protein, UA Trace     Leuk Esterase, UA Negative     Nitrite, UA Negative     Urobilinogen, UA 0.2 E.U./dL    Narrative:      In absence of clinical symptoms, the presence of pyuria, bacteria, and/or nitrites on the urinalysis result does not correlate with infection.  Urine microscopic not indicated.    POC Glucose Once [[id number]]  (Abnormal) Collected: 07/08/24 2111    Specimen: Blood  Updated: 07/08/24 2113     Glucose 192 mg/dL      Comment: Serial Number: [id number]:  [id number]       Respiratory Panel PCR w/COVID-19(SARS-CoV-2) ALFONSO/POOL/ANASTASIA/PAD/COR/MARY In-House, NP Swab in UTM/VTM, 2 HR TAT - Swab, Nasopharynx [[id number]]  (Normal) Collected: 07/08/24 1921    Specimen: Swab from Nasopharynx Updated: 07/08/24 2015     ADENOVIRUS, PCR Not Detected     Coronavirus 229E Not Detected     Coronavirus HKU1 Not Detected     Coronavirus NL63 Not Detected     Coronavirus OC43 Not Detected     COVID19 Not Detected     Human Metapneumovirus Not Detected     Human Rhinovirus/Enterovirus Not Detected     Influenza A PCR Not Detected     Influenza B PCR Not Detected     Parainfluenza Virus 1 Not Detected     Parainfluenza Virus 2 Not Detected     Parainfluenza Virus 3 Not Detected     Parainfluenza Virus 4 Not Detected     RSV, PCR Not Detected     Bordetella pertussis pcr Not Detected     Bordetella parapertussis PCR Not Detected     Chlamydophila pneumoniae PCR Not Detected     Mycoplasma pneumo by PCR Not Detected    Narrative:      In the setting of a positive respiratory panel with a viral infection PLUS a negative procalcitonin without other underlying concern for bacterial infection, consider observing off antibiotics or discontinuation of antibiotics and continue supportive care. If the respiratory panel is positive for atypical bacterial infection (Bordetella pertussis, Chlamydophila pneumoniae, or Mycoplasma pneumoniae), consider antibiotic de-escalation to target atypical bacterial infection.    Ammonia [[id number]]  (Normal) Collected: 07/08/24 1921    Specimen: Blood Updated: 07/08/24 1947     Ammonia 17 umol/L     CK [[id number]]  (Normal) Collected: 07/08/24 1459    Specimen: Blood Updated: 07/08/24 1931     Creatine Kinase 138 U/L     Blood Culture - Blood, Arm, Left [[id number]] Collected: 07/08/24 1921    Specimen: Blood from Arm, Left Updated: 07/08/24 1925    Hemoglobin A1c [[id number]]   (Abnormal) Collected: 07/08/24 1459    Specimen: Blood Updated: 07/08/24 1851     Hemoglobin A1C 6.55 %     COVID-19,CEPHEID/SELMA,COR/ANASTASIA/PAD/MITESH/LAG/MARY IN-HOUSE,NP SWAB IN TRANSPORT MEDIA 1 HR TAT, RT-PCR - Swab, Nasopharynx [[id number]]  (Normal) Collected: 07/08/24 1801    Specimen: Swab from Nasopharynx Updated: 07/08/24 1844     COVID19 Not Detected    Narrative:      Fact sheet for providers: https://www.fda.gov/media/933034/download     Fact sheet for patients: https://www.fda.gov/media/954192/download  Fact sheet for providers: https://www.fda.gov/media/258796/download    Fact sheet for patients: https://www.fda.gov/media/010512/download    Test performed by PCR.    Comprehensive Metabolic Panel [[id number]]  (Abnormal) Collected: 07/08/24 1459    Specimen: Blood Updated: 07/08/24 1751     Glucose 141 mg/dL      BUN 33 mg/dL      Creatinine 1.87 mg/dL      Sodium 141 mmol/L      Potassium 3.7 mmol/L      Chloride 104 mmol/L      CO2 21.1 mmol/L      Calcium 9.7 mg/dL      Total Protein 7.7 g/dL      Albumin 4.6 g/dL      ALT (SGPT) 26 U/L      AST (SGOT) 26 U/L      Alkaline Phosphatase 134 U/L      Total Bilirubin 0.5 mg/dL      Globulin 3.1 gm/dL      A/G Ratio 1.5 g/dL      BUN/Creatinine Ratio 17.6     Anion Gap 15.9 mmol/L      eGFR 36.3 mL/min/1.73     Narrative:      GFR Normal >60  Chronic Kidney Disease <60  Kidney Failure <15    The GFR formula is only valid for adults with stable renal function between ages 18 and 70.    CBC & Differential [[id number]]  (Abnormal) Collected: 07/08/24 1459    Specimen: Blood Updated: 07/08/24 1710    Narrative:      The following orders were created for panel order CBC & Differential.  Procedure                               Abnormality         Status                     ---------                               -----------         ------                     CBC Auto Differential[[id number]]        Abnormal            Final result                 Please view results for  these tests on the individual orders.    CBC Auto Differential [[id number]]  (Abnormal) Collected: 07/08/24 1459    Specimen: Blood Updated: 07/08/24 1710     WBC 20.35 10*3/mm3      RBC 4.77 10*6/mm3      Hemoglobin 15.2 g/dL      Hematocrit 45.9 %      MCV 96.2 fL      MCH 31.9 pg      MCHC 33.1 g/dL      RDW 16.0 %      RDW-SD 57.6 fl      MPV 10.7 fL      Platelets 469 10*3/mm3      Neutrophil % 86.5 %      Lymphocyte % 4.3 %      Monocyte % 8.3 %      Eosinophil % 0.0 %      Basophil % 0.1 %      Immature Grans % 0.8 %      Neutrophils, Absolute 17.59 10*3/mm3      Lymphocytes, Absolute 0.88 10*3/mm3      Monocytes, Absolute 1.69 10*3/mm3      Eosinophils, Absolute 0.00 10*3/mm3      Basophils, Absolute 0.03 10*3/mm3      Immature Grans, Absolute 0.16 10*3/mm3      nRBC 0.0 /100 WBC     [facility] [[id number]] Collected: 07/08/24 1459    Specimen: Blood Updated: 07/08/24 1515    Narrative:      The following orders were created for panel order [facility].  Procedure                               Abnormality         Status                     ---------                               -----------         ------                     Green Top (Gel)[[id number]]                                  Final result               Lavender Top[[id number]]                                     Final result               Gold Top - SST[[id number]]                                   Final result               Light Blue Top[[id number]]                                   Final result                 Please view results for these tests on the individual orders.    Green Top (Gel) [[id number]] Collected: 07/08/24 1459    Specimen: Blood Updated: 07/08/24 1515     Extra Tube Hold for add-ons.     Comment: Auto resulted.       Lavender Top [[id number]] Collected: 07/08/24 1459    Specimen: Blood Updated: 07/08/24 1515     Extra Tube hold for add-on     Comment: Auto resulted       Gold Top - SST [[id number]] Collected: 07/08/24 1459    Specimen:  Blood Updated: 07/08/24 1515     Extra Tube Hold for add-ons.     Comment: Auto resulted.       Light Blue Top [[id number]] Collected: 07/08/24 1459    Specimen: Blood Updated: 07/08/24 1515     Extra Tube Hold for add-ons.     Comment: Auto resulted                Imaging Results (Last 24 Hours)       Procedure Component Value Units Date/Time    XR Ankle 3+ View Right [[id number]] Collected: 07/09/24 0852     Updated: 07/09/24 0857    Narrative:      XR ANKLE 3+ VW RIGHT    Date of Exam: 7/9/2024 8:39 AM EDT    Indication: in boot to see if fibula fracture moved    Comparison: 7/8/2024.    Findings: 3 views through both. Fracture at the base of the lateral malleolus appears unchanged in alignment with slight lateral displacement of distal fracture fragments in relation to proximal. There is a small avulsion from the tip of the medial   malleolus although may be old. The ankle joint is maintained.      Impression:      Impression:  Distal fibular fracture is unchanged in alignment.      Electronically Signed: Landy Stockton MD    7/9/2024 8:54 AM EDT    Workstation ID: [id number]    CT Head Without Contrast [[id number]] Collected: 07/09/24 0722     Updated: 07/09/24 0736    Narrative:      CT HEAD WO CONTRAST    Date of Exam: 7/9/2024 3:50 AM EDT    Indication: frequent falls, unwitnessed.    Comparison: None available.    Technique: Axial CT images were obtained of the head without contrast administration.  Coronal reconstructions were performed.  Automated exposure control and iterative reconstruction methods were used.      Findings:  The calvarium appears intact. Included paranasal sinuses and mastoids appear clear. Soft tissue axial images show expected degree of generalized cerebral atrophy for the patient's age. No evidence of acute infarction, mass, mass effect or hydrocephalus   is seen.     A small curvilinear, extra-axial intermediate density along the left frontotemporal convexity, initial axial images 24  through 27, coronal images 26 and 25, is favored to represent a small dural varix rather than an unusual small aging subdural hematoma,   and there is no evidence of subdural hematoma or other evidence of hemorrhage elsewhere. There are chronic appearing central white matter changes, mostly in the left frontal white matter, but no obvious infarction..      Impression:      Impression:    1. Generalized cerebral atrophy, expected for age.    2. Small extra-axial density along the left frontotemporal convexity favored to represent a small incidental dural varix, rather than meningioma or trace aging subdural hematoma. If no old outside studies are available for comparison, short interval   follow-up CT scan or brain MRI could be considered.      Electronically Signed: Boo Lundy MD    7/9/2024 7:34 AM EDT    Workstation ID: [id number]    MRI Hip Right Without Contrast [[id number]] Collected: 07/09/24 0730     Updated: 07/09/24 0736    Narrative:      MRI HIP RIGHT WO CONTRAST    Date of Exam: 7/8/2024 7:55 PM EDT    Indication: right hip pain, frequent falls, abnormal ct.     Comparison: Hip CT 7/8/2024    Technique:  Routine multiplanar/multisequence images of the right hip were obtained without contrast administration.        Findings:  OSSEOUS STRUCTURES  No fracture, stress reaction, avascular necrosis, or focal osseous lesion is seen. Bone marrow signal intensity is normal. Moderate joint space narrowing both hips.    ARTICULAR CARTILAGE AND LABRUM  Articular cartilage: There is no joint space narrowing or cartilage disease.  Labrum: No labral tear or paralabral cyst is identified. The ligamentum teres is normal.  The alpha angle is  within normal limits (<55-60%).    JOINT OR BURSAL EFFUSION  Joint effusion: There is no significant joint effusion.  Bursal effusion: No iliopsoas or trochanteric bursal effusion is present.    MUSCLES AND TENDONS  The rectus femoris, iliopsoas, proximal hamstrings, quadratus femoris,  and hip abductors are intact.    OTHER FINDINGS  Miscellaneous: Degenerative disc disease with disc desiccation and disc height loss. Moderate facet degenerative change lumbar spine. There are diverticuli in the colon. There is a urinary bladder diverticulum on the right measuring 1.8 cm.  Motion limits evaluation. However this is a diagnostic exam.    Impression:      No fractures of the hip.  Moderate arthritis of the hips.  Degenerative disc disease and facet degenerative change lumbar spine.      Electronically Signed: Oralia Tanner MD    7/9/2024 7:34 AM EDT    Workstation ID: [id number]    XR Chest 1 View [[id number]] Collected: 07/08/24 2219     Updated: 07/08/24 2222    Narrative:      XR CHEST 1 VW    Date of Exam: 7/8/2024 6:58 PM EDT    Indication: leukocytosis, confusion    Comparison: None available.    Findings:  Lines: None    Lungs: The lungs are clear.  Pleura: Pleural thickening at the left lung apex. Questionable trace left apical pneumothorax.    Cardiomediastinum: The cardiomediastinal silhouette is normal    Soft Tissues: Unremarkable.    Bones: No acute osseous abnormality.      Impression:      Impression:  There is pleural thickening with questionable trace left apical pneumothorax, most likely artifactual. If further evaluation is warranted, please consider dedicated CT of the chest.    Otherwise no definite acute cardiopulmonary abnormality      Electronically Signed: Walker Farfan DO    7/8/2024 10:20 PM EDT    Workstation ID: [id number]    CT Lower Extremity Right Without Contrast [[id number]] Collected: 07/08/24 1646     Updated: 07/08/24 1653    Narrative:      CT LOWER EXTREMITY RIGHT WO CONTRAST    Date of Exam: 7/8/2024 4:42 PM EDT    Indication: right hip pain/ falls neg xray.    Comparison: None available.    Technique: Axial CT images were obtained of the right lower extremity without contrast administration.  Sagittal and coronal reconstructions were performed.  Automated exposure  control and iterative reconstruction methods were used.      Findings:  Osteopenia limits evaluation for nondisplaced fractures. If concern for radiographically occult fracture MRI study of choice. There is questionable nondisplaced fracture at the femoral neck best seen on the axial image #158. Avascular channel cannot be   excluded. MRI of the hip is recommended if patient has difficulty weightbearing or pain.    There is moderate degenerative change of the hip joint. There are no aggressive osseous lesions.    No significant joint effusion is seen on CT. There are vascular calcifications. There are diverticuli in the colon.      Impression:      Impression:  CT of the hip is nondiagnostic for hip fracture. There is a questionable nondisplaced fracture of the femoral neck could be seen with nondisplaced fracture or vascular groove. If patient has difficulty weightbearing or pain, MRI of the hip is recommended   for better evaluation.  Osteopenia limits evaluation.  Moderate arthritis of the hip.        Electronically Signed: Oralia Tanner MD    7/8/2024 4:50 PM EDT    Workstation ID: [id number]    XR Ankle 3+ View Right [[id number]] Collected: 07/08/24 1526     Updated: 07/08/24 1530    Narrative:      XR ANKLE 3+ VW RIGHT    Date of Exam: 7/8/2024 3:18 PM EDT    Indication: pain after falling    Comparison: None available.    Findings:  Minimally displaced fracture through the distal fibula/lateral malleolus. The remainder of the osseous structures are intact.    The ankle mortise is intact.  Mild degenerative changes of the ankle.    Soft tissue edema surrounding the ankle.      Impression:      Impression:  Minimally displaced fracture through the distal fibula/lateral malleolus.      Electronically Signed: Walker Farfan DO    7/8/2024 3:28 PM EDT    Workstation ID: [id number]                 I reviewed the patient's new clinical results.    Medication Review:   Scheduled Meds:famotidine, 40 mg, Oral,  Daily  sodium chloride, 10 mL, Intravenous, Q12H      Continuous Infusions:   PRN Meds:.  acetaminophen    senna-docusate sodium **AND** polyethylene glycol **AND** bisacodyl **AND** bisacodyl    Calcium Replacement - Follow Nurse / BPA Driven Protocol    HYDROcodone-acetaminophen    Magnesium Standard Dose Replacement - Follow Nurse / BPA Driven Protocol    ondansetron    Phosphorus Replacement - Follow Nurse / BPA Driven Protocol    Potassium Replacement - Follow Nurse / BPA Driven Protocol    [COMPLETED] Insert Peripheral IV **AND** sodium chloride    sodium chloride    [COMPLETED] Insert Peripheral IV **AND** sodium chloride    sodium chloride    sodium chloride     Interval History:    Assessment & Plan     Right hip pain  - difficulty bearing weight  -MRI negative for acute findings     Right fibula fracture  -per ortho very minimally displaced and could be amenable to nonoperative management which would be placement of a cast and nonweightbearing for a total of 6 weeks. I will see him in clinic and place this cast. 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 If no old outside studies are available for comparison, short interval follow-up CT scan or brain MRI could be considered      Leukocytosis/afebrile with no compalints - UA neg, respiratory panel neg, chest xray ne, blood cultures     Chronic insomnia - holding Ambien given altered mental status     DM-II - consistent carb diet; check A1C 6.55     CKD stage 3 - creatinine is at baseline; avoid nephrotoxins       Plan for disposition:Inpt rehab    LANI Langford  07/09/24  11:24 EDT

## 2024-07-09 NOTE — CONSULTS
Orthopaedic Consult    Marianna Chiu MD      Patient: Yuan Salas    Date of Admission: 7/8/2024  3:03 PM    YOB: 1945    Medical Record Number: [id number]    Attending Physician: Sonia Valadez MD  Consulting Physician: Marianna Chiu MD      Chief Complaints: Right hip pain [M25.551]  Multiple falls [R29.6]  Closed nondisplaced fracture of lateral malleolus of right fibula, initial encounter [S82.64XA]  Acute hip pain, right [M25.551]      History of Present Illness: 79 y.o. male admitted to [facility] with Right hip pain [M25.551]  Multiple falls [R29.6]  Closed nondisplaced fracture of lateral malleolus of right fibula, initial encounter [S82.64XA]  Acute hip pain, right [M25.551]. I was consulted for further evaluation and treatment. Onset of symptoms was yesterday.  He lives alone.  He reports he fell going to the bathroom and fell between the couch.  He landed on his right ankle and his right hip.  He believes most of the pain is sciatic and related of his hip.  He is complaining of pain in his ankle mostly on the lateral side.  He reports he was a diabetic but is diet controlled.  He denies use of tobacco products.  Overall he reports he is relatively healthy does not use an assistive device at home to walk.     Allergies: No Known Allergies    Medications:   Home Medications:  Medications Prior to Admission   Medication Sig Dispense Refill Last Dose    allopurinol (ZYLOPRIM) 100 MG tablet Take 1 tablet by mouth Daily.   7/7/2024    amLODIPine (NORVASC) 5 MG tablet Take 1 tablet by mouth Daily.   7/7/2024    atorvastatin (LIPITOR) 40 MG tablet Take 1 tablet by mouth Daily.   7/7/2024    dapagliflozin Propanediol (Farxiga) 10 MG tablet Take 10 mg by mouth Daily.   7/8/2024    FLUoxetine (PROzac) 40 MG capsule Take 1 capsule by mouth Daily.   7/8/2024    levothyroxine (SYNTHROID, LEVOTHROID) 50 MCG tablet Take 1 tablet by mouth Daily.   7/8/2024    pioglitazone (ACTOS) 30 MG  tablet Take 1 tablet by mouth Daily.   7/8/2024    zolpidem (Ambien) 10 MG tablet Take 1 tablet by mouth At Night As Needed for Sleep.   7/7/2024    methocarbamol (ROBAXIN) 750 MG tablet Take 1 tablet by mouth 3 (Three) Times a Day As Needed for Muscle Spasms.          Current Medications:  Scheduled Meds:famotidine, 40 mg, Oral, Daily  sodium chloride, 10 mL, Intravenous, Q12H      Continuous Infusions:   PRN Meds:.  acetaminophen    senna-docusate sodium **AND** polyethylene glycol **AND** bisacodyl **AND** bisacodyl    Calcium Replacement - Follow Nurse / BPA Driven Protocol    HYDROcodone-acetaminophen    Magnesium Standard Dose Replacement - Follow Nurse / BPA Driven Protocol    ondansetron    Phosphorus Replacement - Follow Nurse / BPA Driven Protocol    Potassium Replacement - Follow Nurse / BPA Driven Protocol    [COMPLETED] Insert Peripheral IV **AND** sodium chloride    sodium chloride    [COMPLETED] Insert Peripheral IV **AND** sodium chloride    sodium chloride    sodium chloride    Past Medical History:   Diagnosis Date    Asthma     Chronic headaches     Diabetes mellitus     Disease of thyroid gland     Gout     Hyperlipidemia     Hypertension     Sleep apnea     does not use cpap or bipap     Past Surgical History:   Procedure Laterality Date    CATARACT EXTRACTION      RETINAL DETACHMENT SURGERY Bilateral      Social History     Occupational History    Not on file   Tobacco Use    Smoking status: Never    Smokeless tobacco: Never   Vaping Use    Vaping status: Never Used   Substance and Sexual Activity    Alcohol use: Never    Drug use: Never    Sexual activity: Not on file      Social History     Social History Narrative    Not on file     History reviewed. No pertinent family history.      Review of Systems:   Constitutional: Negative for fatigue, fever, or weight loss  HEENT: Patient denies any headaches, vision changes, sore throat. Admits to wearing glasses  Pulmonary: Patient denies any cough,  "congestion, SOA, or wheezing  Cardiovascular: Patient denies any chest pain, palpitations, weakness,  Gastrointestinal:  Patient denies nausea, vomiting, diarrhea, constipation  Genital/Urinary: Patient denies dysuria, urinary frequency, urgency, incontinence, retention  Musculoskeletal: Positive for right hip pain mostly sciatic in nature and ankle pain. Negative for muscle cramps  Neurological: Patient denies dizziness, paresthesia, loss of sensation, seizures, syncope  Endocrine system: Patient denies tremors, cold or heat intolerance  Psychological: Patient denies thoughts/plans or harming self or other; hallucinations,  insomnia  Skin: Patient denies any bruising, rashes, lesions, or ulcers   Hematopoietic: Patient denies history of MRSA or slow wound healing,  spontaneous or excessive bleeding    Physical Exam: 79 y.o. male  Vitals:    07/08/24 1802 07/08/24 2102 07/09/24 0035 07/09/24 0428   BP: 129/61 122/70 127/71 121/74   BP Location:  Right arm Right arm Right arm   Patient Position:  Lying Lying Lying   Pulse: 79 89 94 85   Resp: 18 18 18 16   Temp:  97.6 °F (36.4 °C) 97.6 °F (36.4 °C) 97.5 °F (36.4 °C)   TempSrc:  Oral Oral Oral   SpO2: 91% 93% 98% 91%   Weight:  82.4 kg (181 lb 10.5 oz)     Height:  172.7 cm (68\")                              General Appearance:  Alert, cooperative, in no acute distress    HEENT:    Normocephalic,  Atraumatic, Pupils are equal   Neck:   No adenopathy, supple, trachea midline, no thyromegaly       Lungs:     Clear to auscultation, equal expansion bilaterally, respirations regular, even and               unlabored    Heart:    Regular rhythm and normal rate, normal S1 and S2, no murmur, no gallops   Chest Wall:    Within normal limits   Abdomen:     Normal bowel sounds, no masses,  soft non-tender, non-distended,       Rectal:  Musculoskeletal:     Deferred  He is nontender with a logroll of the right lower extremity.  He is able to flex and extend through the hip.  He " is nontender over the greater trochanter.  He reports most of the pain is located in the back of his hip radiating down his leg.  He is tender palpation over his lateral ankle there is appropriate ecchymoses.  He is able to flex and extend all of his toes neuro intact   Extremities:   No clubbing, no edema, no cyanosis   Pulses  Neurovascular:   Pulses palpable and equal bilaterally in all 4 extremities    Patient was alert and oriented x 3 spheres   Skin:   No lesions, ulcers or rashes   Neurologic:   Cranial nerves 2 - 12 grossly intact, sensation intact            Diagnostic Tests:    I reviewed his CT and MRI of his right femur I do not personally appreciate any fractures.  I will wait to the final read of the radiologist is made.  I do believe this is okay for no management from orthopedic standpoint.  He is symptomatic from sciatica     3 views of his right ankle demonstrate that there is a mildly displaced lateral malleolus fracture.            Assessment:    Right hip pain    Right sciatic pain possible hip fracture  Right lateral malleolus fracture        Plan:      I will get x-rays of his ankle in the boot and follow-up on this.  This is very minimally displaced and could be amenable to nonoperative management which would be placement of a cast and nonweightbearing for a total of 6 weeks.  I will see him in clinic and place this cast.  Discussed with the patient if he does have movement of his lateral malleolus in the boot we would have a operative discussion.  Questions encouraged and answered no guarantees given.    Date: 7/9/2024  Marianna Chiu MD  Orthopaedic Surgeon    [facility]  [phone]  [email or web address]

## 2024-07-09 NOTE — THERAPY EVALUATION
Patient Name: Yuna Salas  : 1945    MRN: [id number]                              Today's Date: 2024       Admit Date: 2024    Visit Dx:     ICD-10-CM ICD-9-CM   1. Acute hip pain, right  M25.551 719.45   2. Multiple falls  R29.6 V15.88   3. Closed nondisplaced fracture of lateral malleolus of right fibula, initial encounter  S82.64XA 824.2     Patient Active Problem List   Diagnosis    Right hip pain     Past Medical History:   Diagnosis Date    Asthma     Chronic headaches     Diabetes mellitus     Disease of thyroid gland     Gout     Hyperlipidemia     Hypertension     Sleep apnea     does not use cpap or bipap     Past Surgical History:   Procedure Laterality Date    CATARACT EXTRACTION      RETINAL DETACHMENT SURGERY Bilateral       General Information       Row Name 24 1453          Physical Therapy Time and Intention    Document Type evaluation  -BR     Mode of Treatment physical therapy  -BR       Row Name 24 1457 24 1453       General Information    Patient Profile Reviewed -- yes  -BR    Prior Level of Function --  Pt uses rolling walker or cane at baseline.  -BR independent:;all household mobility;community mobility;gait;transfer;driving  -BR    Existing Precautions/Restrictions -- non-weight bearing;fall;other (see comments)  NWB RLE  -BR    Barriers to Rehab -- --  Pt has no family or friends to support him at home.  -BR      Row Name 24 1453          Living Environment    People in Home alone  -BR       Row Name 24 1453          Home Main Entrance    Number of Stairs, Main Entrance none  -BR       Row Name 24 1453          Stairs Within Home, Primary    Number of Stairs, Within Home, Primary none  -BR       Row Name 24 1453          Cognition    Orientation Status (Cognition) oriented x 4  -BR       Row Name 24 1453          Safety Issues, Functional Mobility    Safety Issues Affecting Function (Mobility) insight into  deficits/self-awareness;problem-solving;unable to maintain weight-bearing restrictions  -BR     Impairments Affecting Function (Mobility) balance;pain;endurance/activity tolerance;strength  -BR               User Key  (r) = Recorded By, (t) = Taken By, (c) = Cosigned By      Initials Name Provider Type    BR Maria Del Carmen Melgar, PT Physical Therapist                   Mobility       Row Name 07/09/24 1456          Bed Mobility    Bed Mobility supine-sit  -BR     Supine-Sit Eddy (Bed Mobility) verbal cues;supervision  -BR       Row Name 07/09/24 1457          Bed-Chair Transfer    Bed-Chair Eddy (Transfers) moderate assist (50% patient effort);2 person assist;verbal cues  -BR     Assistive Device (Bed-Chair Transfers) walker, front-wheeled  -BR     Comment, (Bed-Chair Transfer) This PT placed her foot beneath pt's left foot that was immobilized with CAM boot to ensure NWB. Pt unable to offload RLE using BUE. Pt pivoted small steps on RLE.  -BR       Row Name 07/09/24 1457          Sit-Stand Transfer    Sit-Stand Eddy (Transfers) verbal cues;2 person assist;minimum assist (75% patient effort)  -BR     Assistive Device (Sit-Stand Transfers) walker, front-wheeled  -BR     Comment, (Sit-Stand Transfer) Pt required 100% cueing for NWB RLE.  -BR       Row Name 07/09/24 1457          Gait/Stairs (Locomotion)    [place] Level (Gait) moderate assist (50% patient effort);2 person assist  -BR     Assistive Device (Gait) walker, front-wheeled  -BR     Patient was able to Ambulate yes  -BR     Distance in Feet (Gait) 1  -BR     Deviations/Abnormal Patterns (Gait) weight shifting decreased  -BR     Bilateral Gait Deviations forward flexed posture  -BR     Comment, (Gait/Stairs) 3 small scooting steps on RLE to pivot over to recliner  -BR       Row Name 07/09/24 1456          Mobility    Extremity Weight-bearing Status right lower extremity  -BR     Right Lower Extremity (Weight-bearing Status) non  weight-bearing (NWB)  -BR               User Key  (r) = Recorded By, (t) = Taken By, (c) = Cosigned By      Initials Name Provider Type    Maria Del Carmen Watson PT Physical Therapist                   Obj/Interventions       Row Name 07/09/24 1500          Range of Motion Comprehensive    General Range of Motion bilateral lower extremity ROM WFL  -BR     Comment, General Range of Motion R foot immobilized in CAM boot.  -BR       Row Name 07/09/24 1500          Strength Comprehensive (MMT)    Comment, General Manual Muscle Testing (MMT) Assessment Grossly 3+/5 LLE and Right hip/knee  -BR       Row Name 07/09/24 1500          Balance    Balance Assessment sitting static balance;sitting dynamic balance;standing static balance  -BR     Static Sitting Balance supervision  -BR     Dynamic Sitting Balance supervision  -BR     Position, Sitting Balance unsupported;sitting edge of bed  -BR     Dynamic Standing Balance minimal assist;2-person assist;verbal cues  -BR     Position/Device Used, Standing Balance supported;walker, rolling  -BR       Row Name 07/09/24 1500          Sensory Assessment (Somatosensory)    Sensory Assessment (Somatosensory) sensation intact  -BR               User Key  (r) = Recorded By, (t) = Taken By, (c) = Cosigned By      Initials Name Provider Type    Maria Del Carmen Watson PT Physical Therapist                   Goals/Plan       Row Name 07/09/24 1514          Bed Mobility Goal 1 (PT)    Activity/Assistive Device (Bed Mobility Goal 1, PT) bed mobility activities, all  -BR     [place] Level/Cues Needed (Bed Mobility Goal 1, PT) independent  -BR     Time Frame (Bed Mobility Goal 1, PT) long term goal (LTG);2 weeks  -BR       Row Name 07/09/24 1514          Transfer Goal 1 (PT)    Activity/Assistive Device (Transfer Goal 1, PT) transfers, all  -BR     [place] Level/Cues Needed (Transfer Goal 1, PT) modified independence  -BR     Time Frame (Transfer Goal 1, PT) long term goal (LTG);2 weeks   -BR       Row Name 07/09/24 1514          Gait Training Goal 1 (PT)    Activity/Assistive Device (Gait Training Goal 1, PT) gait (walking locomotion);assistive device use  -BR     [place] Level (Gait Training Goal 1, PT) modified independence  -BR     Distance (Gait Training Goal 1, PT) 10  -BR     Time Frame (Gait Training Goal 1, PT) long term goal (LTG);2 weeks  -BR       Row Name 07/09/24 1514          Therapy Assessment/Plan (PT)    Planned Therapy Interventions (PT) balance training;bed mobility training;gait training;patient/family education;strengthening;ROM (range of motion);transfer training  -BR               User Key  (r) = Recorded By, (t) = Taken By, (c) = Cosigned By      Initials Name Provider Type    BR Maria Del Carmen Melgar PT Physical Therapist                   Clinical Impression       Row Name 07/09/24 1501          Pain    Pretreatment Pain Rating 0/10 - no pain  -BR     Posttreatment Pain Rating 1/10  -BR     Pain Location - Side/Orientation Right  -BR     Pain Location - foot  -BR       Row Name 07/09/24 1501          Plan of Care Review    Plan of Care Reviewed With patient  -BR     Outcome Evaluation Pt presents as a 78 y/o F admitted to [facility] on 7/8/24 with acute right leg pain. Imaging showed Right lateral malleolus fracture. He reports he fell going to the bathroom and fell between the couch. He landed on his right ankle and his right hip. Per ortho: nonoperative management which would be placement of a cast and nonweightbearing for a total of 6 weeks. MRI right hip was negative for fx. Pt A and O x 4. At baseline, pt lives alone in a ground floor apt with 0 YASMINE and he uses a rolling walker for household mobility; pt drives. Pt has a CAM boot in place RLE. Pt requiring mod assist of 2 for a stand-pivot-sit transfer with rolling walker from bed to recliner NWB RLE. PT kept her foot beneath pt's R CAM boot to monitor his NWB ability. Pt was unable to offload weight on to BUE to take  hopping steps. Pt does not have coordination needed to use knee scooter. Pt will need a wheelchair for the next 6 weeks to maintain NWB RLE. Pt has no family or friends to assist him at home. PT recommendation is In-patient rehab Facility.  -BR       Row Name 07/09/24 1501          Therapy Assessment/Plan (PT)    Rehab Potential (PT) good, to achieve stated therapy goals  -BR     Criteria for Skilled Interventions Met (PT) yes;meets criteria;skilled treatment is necessary  -BR     Therapy Frequency (PT) daily  -BR     Predicted Duration of Therapy Intervention (PT) until D/C  -BR       Row Name 07/09/24 1501          Vital Signs    O2 Delivery Pre Treatment room air  -BR     Pre Patient Position Supine  -BR     Intra Patient Position Standing  -BR     Post Patient Position Sitting  -BR       Row Name 07/09/24 1501          Positioning and Restraints    Pre-Treatment Position in bed  -BR     Post Treatment Position chair  -BR     In Chair notified nsg;reclined;call light within reach;encouraged to call for assist;exit alarm on;legs elevated  -BR               User Key  (r) = Recorded By, (t) = Taken By, (c) = Cosigned By      Initials Name Provider Type    BR Maria Del Carmen Melgar, PT Physical Therapist                   Outcome Measures       Row Name 07/09/24 1515 07/09/24 0800       How much help from another person do you currently need...    Turning from your back to your side while in flat bed without using bedrails? 3  -BR 4  -SM    Moving from lying on back to sitting on the side of a flat bed without bedrails? 3  -BR 3  -SM    Moving to and from a bed to a chair (including a wheelchair)? 2  -BR 3  -SM    Standing up from a chair using your arms (e.g., wheelchair, bedside chair)? 2  -BR 3  -SM    Climbing 3-5 steps with a railing? 1  -BR 3  -SM    To walk in hospital room? 2  -BR 3  -SM    AM-PAC 6 Clicks Score (PT) 13  -BR 19  -SM    Highest Level of Mobility Goal 4 --> Transfer to chair/commode  -BR 6 --> Walk  10 steps or more  -      Row Name 07/09/24 1515          Functional Assessment    Outcome Measure Options AM-PAC 6 Clicks Basic Mobility (PT)  -BR               User Key  (r) = Recorded By, (t) = Taken By, (c) = Cosigned By      Initials Name Provider Type     Ena Rachel, RN Registered Nurse    Maria Del Carmen Watson PT Physical Therapist                                 Physical Therapy Education       Title: PT OT SLP Therapies (Done)       Topic: Physical Therapy (Done)       Point: Mobility training (Done)       Learning Progress Summary             Patient Acceptance, E,D, VU,DU,NR by BR at 7/9/2024 1515                         Point: Body mechanics (Done)       Learning Progress Summary             Patient Acceptance, E,D, VU,DU,NR by BR at 7/9/2024 1515                         Point: Precautions (Done)       Learning Progress Summary             Patient Acceptance, E,D, VU,DU,NR by BR at 7/9/2024 1515                                         User Key       Initials Effective Dates Name Provider Type Discipline     02/01/22 -  Maria Del Carmen Melgar PT Physical Therapist PT                  PT Recommendation and Plan  Planned Therapy Interventions (PT): balance training, bed mobility training, gait training, patient/family education, strengthening, ROM (range of motion), transfer training  Plan of Care Reviewed With: patient  Outcome Evaluation: Pt presents as a 80 y/o F admitted [facility]24 with acute right leg pain. Imaging showed Right lateral malleolus fracture. He reports he fell going to the bathroom and fell between the couch. He landed on his right ankle and his right hip. Per ortho: nonoperative management which would be placement of a cast and nonweightbearing for a total of 6 weeks. MRI right hip was negative for fx. Pt A and O x 4. At baseline, pt lives alone in a ground floor apt with 0 YASMINE and he uses a rolling walker for household mobility; pt drives. Pt has a CAM boot in place RLE. Pt  requiring mod assist of 2 for a stand-pivot-sit transfer with rolling walker from bed to recliner NWB RLE. PT kept her foot beneath pt's R CAM boot to monitor his NWB ability. Pt was unable to offload weight on to BUE to take hopping steps. Pt does not have coordination needed to use knee scooter. Pt will need a wheelchair for the next 6 weeks to maintain NWB RLE. Pt has no family or friends to assist him at home. PT recommendation is In-patient rehab Facility.     Time Calculation:              PT G-Codes  Outcome Measure Options: AM-PAC 6 Clicks Basic Mobility (PT)  AM-PAC 6 Clicks Score (PT): 13  PT Discharge Summary  Anticipated Discharge Disposition (PT): inpatient rehabilitation facility    Maria Del Carmen Melgar, JOSHUA  7/9/2024

## 2024-07-09 NOTE — THERAPY EVALUATION
Patient Name: Yuan Salas  : 1945    MRN: [id number]                              Today's Date: 2024       Admit Date: 2024    Visit Dx:     ICD-10-CM ICD-9-CM   1. Acute hip pain, right  M25.551 719.45   2. Multiple falls  R29.6 V15.88   3. Closed nondisplaced fracture of lateral malleolus of right fibula, initial encounter  S82.64XA 824.2     Patient Active Problem List   Diagnosis    Right hip pain     Past Medical History:   Diagnosis Date    Asthma     Chronic headaches     Diabetes mellitus     Disease of thyroid gland     Gout     Hyperlipidemia     Hypertension     Sleep apnea     does not use cpap or bipap     Past Surgical History:   Procedure Laterality Date    CATARACT EXTRACTION      RETINAL DETACHMENT SURGERY Bilateral       General Information       Row Name 24 1046          OT Time and Intention    Document Type evaluation  -MS     Mode of Treatment occupational therapy  -MS       Row Name 24 1046          General Information    Patient Profile Reviewed yes  -MS     Prior Level of Function independent:;ADL's;all household mobility;community mobility;driving  -MS     Existing Precautions/Restrictions fall;right;non-weight bearing  RLE NWB with CAM boot  -MS     Barriers to Rehab --  limited social/physical support  -MS       Row Name 24 1046          Occupational Profile    Reason for Services/Referral (Occupational Profile) Pt is a 80 y/o M admitted to [facility] 24 with c/o R hip pain, noted to have x2 falls prior to admission. Ortho following: recommending RLE NWB CAM boot 6 weeks. XR R ankle (+) minimally displaced fx through distal fibula/lateral malleolus (24), (+) distal fibular fx unchanged (24). MRI hip (-) acute fx. PMHx significant for CKDIII and DMII. At baseline pt resides in ground level apartment, [address]. Pt typically (I) with ADLs, (I) with mobility with RW, also has cane if needed. Pt is an active . Pt has limited social support.  -MS      Environmental Supports and Barriers (Occupational Profile) limited support  -MS       Row Name 07/09/24 1046          Living Environment    People in Home alone  -MS       Row Name 07/09/24 1046          Home Main Entrance    Number of Stairs, Main Entrance none  -MS       Row Name 07/09/24 1046          Stairs Within Home, Primary    Number of Stairs, Within Home, Primary none  -MS       Row Name 07/09/24 1046          Cognition    Orientation Status (Cognition) oriented x 4  difficulty problem solving, poor insight to deficits  -MS       Row Name 07/09/24 1046          Safety Issues, Functional Mobility    Safety Issues Affecting Function (Mobility) insight into deficits/self-awareness;judgment;problem-solving;safety precaution awareness;unable to maintain weight-bearing restrictions  -MS     Impairments Affecting Function (Mobility) balance;pain;endurance/activity tolerance  -MS               User Key  (r) = Recorded By, (t) = Taken By, (c) = Cosigned By      Initials Name Provider Type    MS Marisel Finney OT Occupational Therapist                     Mobility/ADL's       Row Name 07/09/24 1047          Bed Mobility    Bed Mobility supine-sit  -MS     Supine-Sit [place] (Bed Mobility) verbal cues;standby assist  -MS     Bed Mobility, Safety Issues decreased use of legs for bridging/pushing  -MS     Comment, (Bed Mobility) cues to not bear weight through RLE with bed mobility  -MS       Row Name 07/09/24 1047          Transfers    Transfers sit-stand transfer;bed-chair transfer  -MS       Row Name 07/09/24 1047          Bed-Chair Transfer    Bed-Chair [place] (Transfers) moderate assist (50% patient effort);2 person assist  -MS     Assistive Device (Bed-Chair Transfers) walker, front-wheeled  -MS     Comment, (Bed-Chair Transfer) unable to adhere to weight bearing status, PT placed foot under CAM boot to ensure NWB and provide tactile cueing, difficulty managing RW and advancing to take step  -MS        Row Name 07/09/24 1047          Sit-Stand Transfer    Sit-Stand [place] (Transfers) minimum assist (75% patient effort);2 person assist  -MS     Assistive Device (Sit-Stand Transfers) walker, front-wheeled  -MS     Comment, (Sit-Stand Transfer) difficulty adhering to weight bearing status  -MS       Row Name 07/09/24 1047          Functional Mobility    Patient was able to Ambulate yes  -MS       Row Name 07/09/24 1047          Mobility    Extremity Weight-bearing Status right lower extremity  -MS     Right Lower Extremity (Weight-bearing Status) non weight-bearing (NWB)  with CAM boot  -MS               User Key  (r) = Recorded By, (t) = Taken By, (c) = Cosigned By      Initials Name Provider Type    Marisel Veloz OT Occupational Therapist                   Obj/Interventions       [facility] Name 07/09/24 1050          Sensory Assessment (Somatosensory)    Sensory Assessment (Somatosensory) UE sensation intact  -MS       Row Name 07/09/24 1050          Vision Assessment/Intervention    Visual Impairment/Limitations WNL  -MS       Row Name 07/09/24 1050          Range of Motion Comprehensive    Comment, General Range of Motion BUE WNL  -MS       [facility] Name 07/09/24 1050          Strength Comprehensive (MMT)    Comment, General Manual Muscle Testing (MMT) Assessment BUE grossly 4-/5  -MS       Row Name 07/09/24 1050          Balance    Balance Assessment sitting static balance;sitting dynamic balance;standing static balance;standing dynamic balance  -MS     Static Sitting Balance supervision  -MS     Dynamic Sitting Balance standby assist  -MS     Position, Sitting Balance unsupported;sitting edge of bed  -MS     Static Standing Balance minimal assist  -MS     Dynamic Standing Balance minimal assist;2-person assist  -MS     Position/Device Used, Standing Balance supported;walker, front-wheeled  -MS     Comment, Balance difficulty with single leg standing with RW upon standing  -MS               User Key  (r) =  Recorded By, (t) = Taken By, (c) = Cosigned By      Initials Name Provider Type    Marisel Veloz, OT Occupational Therapist                   Goals/Plan       Row Name 07/09/24 1635          Bed Mobility Goal 1 (OT)    Activity/Assistive Device (Bed Mobility Goal 1, OT) bed mobility activities, all  -MS     [place] Level/Cues Needed (Bed Mobility Goal 1, OT) modified independence  -MS     Time Frame (Bed Mobility Goal 1, OT) long term goal (LTG);2 weeks  -MS     Strategies/Barriers (Bed Mobility Goal 1, OT) without verbal cues for WBS  -MS     Progress/Outcomes (Bed Mobility Goal 1, OT) new goal  -MS       Row Name 07/09/24 1635          Transfer Goal 1 (OT)    Activity/Assistive Device (Transfer Goal 1, OT) transfers, all  -MS     [place] Level/Cues Needed (Transfer Goal 1, OT) minimum assist (75% or more patient effort)  -MS     Time Frame (Transfer Goal 1, OT) long term goal (LTG);2 weeks  -MS     Strategies/Barriers (Transfers Goal 1, OT) without verbal cues for WBS  -MS     Progress/Outcome (Transfer Goal 1, OT) new goal  -MS       Row Name 07/09/24 1635          Dressing Goal 1 (OT)    Activity/Device (Dressing Goal 1, OT) lower body dressing  -MS     [place]/Cues Needed (Dressing Goal 1, OT) modified independence  -MS     Time Frame (Dressing Goal 1, OT) long term goal (LTG);2 weeks  -MS     Strategies/Barriers (Dressing Goal 1, OT) without verbal cues for WBS  -MS       Row Name 07/09/24 1635          Toileting Goal 1 (OT)    Activity/Device (Toileting Goal 1, OT) toileting skills, all  -MS     [place] Level/Cues Needed (Toileting Goal 1, OT) modified independence  -MS     Time Frame (Toileting Goal 1, OT) long term goal (LTG);2 weeks  -MS     Strategies/Barriers (Toileting Goal 1, OT) without verbal cues for WBS  -MS     Progress/Outcome (Toileting Goal 1, OT) new goal  -MS       Row Name 07/09/24 1635          Therapy Assessment/Plan (OT)    Planned Therapy Interventions (OT)  activity tolerance training;adaptive equipment training;BADL retraining;functional balance retraining;IADL retraining;occupation/activity based interventions;passive ROM/stretching;patient/caregiver education/training;transfer/mobility retraining;strengthening exercise;ROM/therapeutic exercise  -MS               User Key  (r) = Recorded By, (t) = Taken By, (c) = Cosigned By      Initials Name Provider Type    Marisel Veloz, OT Occupational Therapist                   Clinical Impression       Row Name 07/09/24 7387          Pain Assessment    Pretreatment Pain Rating 0/10 - no pain  -MS     Posttreatment Pain Rating 1/10  -MS     Pain Location - Side/Orientation Right  -MS     Pain Location - foot  -MS     Pain Intervention(s) Repositioned;Emotional support;Nursing Notified  -MS       Row Name 07/09/24 5269          Plan of Care Review    Plan of Care Reviewed With patient  -MS     Progress no change  -MS     Outcome Evaluation Pt is a 78 y/o M admitted to [facility] 7/8/24 with c/o R hip pain, noted to have x2 falls prior to admission. Ortho following: recommending RLE NWB CAM boot 6 weeks. At baseline pt resides in ground level apartment, [address]. Pt typically (I) with ADLs, (I) with mobility with RW, also has cane if needed. Pt is an active . Pt has limited social support. This date pt A&Ox4 on RA sitting up in bed upon arrival, minimal pain with mobility. Pt educated on RLE NWB with CAM boot, verbalized understanding however does require significant verbal cues throughout to adhere to. Pt requires SBA for bed mobility, verbal cues to adhere to WBS, avoid pushing through RLE with bed mobility. Pt comes to standing with mod A x2 with RW and requires mod A x2 to transfer from bed to chair with RW. Pt demo difficulty adhering to WBS upon initial stand, therefore chair was placed closer to bed to complete SPT, with PT placing foot under pt CAM boot to monitor NWB, continues to require mod A x2 with RW. Pt demo poor  follow through with WBS, poor insight to deficits and poor problem solving. Pt is at significantly high risk for falls and is not safe to dc home at this time. OT recommend acute IP rehab when medically appropriate for dc, likely to progress well with transfer training, AE, and compensatory strategies to increase (I) to max potential. Pt noted to have limited support system, high risk for re-admission if dc home. OT will follow within acute setting.  -MS       Row Name 07/09/24 1627          Therapy Assessment/Plan (OT)    Rehab Potential (OT) good, to achieve stated therapy goals  -MS     Criteria for Skilled Therapeutic Interventions Met (OT) yes;meets criteria;skilled treatment is necessary  -MS     Therapy Frequency (OT) 5 times/wk  -MS     Predicted Duration of Therapy Intervention (OT) until d/c  -MS       Row Name 07/09/24 1627          Therapy Plan Review/Discharge Plan (OT)    Anticipated Discharge Disposition (OT) inpatient rehabilitation facility  -MS       Row Name 07/09/24 1627          Vital Signs    O2 Delivery Pre Treatment room air  -MS     O2 Delivery Intra Treatment room air  -MS     O2 Delivery Post Treatment room air  -MS     Pre Patient Position Supine  -MS     Intra Patient Position Standing  -MS     Post Patient Position Sitting  -MS       Row Name 07/09/24 1627          Positioning and Restraints    Pre-Treatment Position in bed  -MS     Post Treatment Position chair  -MS     In Chair notified nsg;reclined;call light within reach;encouraged to call for assist;exit alarm on;legs elevated  -MS               User Key  (r) = Recorded By, (t) = Taken By, (c) = Cosigned By      Initials Name Provider Type    MS Marisel Finney, OT Occupational Therapist                   Outcome Measures       Row Name 07/09/24 1635          How much help from another is currently needed...    Putting on and taking off regular lower body clothing? 2  -MS     Bathing (including washing, rinsing, and drying) 2  -MS      Toileting (which includes using toilet bed pan or urinal) 2  -MS     Putting on and taking off regular upper body clothing 4  -MS     Taking care of personal grooming (such as brushing teeth) 4  -MS     Eating meals 4  -MS     AM-PAC 6 Clicks Score (OT) 18  -MS       Row Name 07/09/24 1515 07/09/24 0800       How much help from another person do you currently need...    Turning from your back to your side while in flat bed without using bedrails? 3  -BR 4  -SM    Moving from lying on back to sitting on the side of a flat bed without bedrails? 3  -BR 3  -SM    Moving to and from a bed to a chair (including a wheelchair)? 2  -BR 3  -SM    Standing up from a chair using your arms (e.g., wheelchair, bedside chair)? 2  -BR 3  -SM    Climbing 3-5 steps with a railing? 1  -BR 3  -SM    To walk in hospital room? 2  -BR 3  -SM    AM-PAC 6 Clicks Score (PT) 13  -BR 19  -SM    Highest Level of Mobility Goal 4 --> Transfer to chair/commode  -BR 6 --> Walk 10 steps or more  -      Row Name 07/09/24 1635 07/09/24 1515       Functional Assessment    Outcome Measure Options AM-PAC 6 Clicks Daily Activity (OT)  -MS AM-PAC 6 Clicks Basic Mobility (PT)  -BR              User Key  (r) = Recorded By, (t) = Taken By, (c) = Cosigned By      Initials Name Provider Type    Marisel Veloz, OT Occupational Therapist    Ena Morales, RN Registered Nurse    Maria Del Carmen Watson, PT Physical Therapist                    Occupational Therapy Education       Title: PT OT SLP Therapies (Done)       Topic: Occupational Therapy (Done)       Point: ADL training (Done)       Description:   Instruct learner(s) on proper safety adaptation and remediation techniques during self care or transfers.   Instruct in proper use of assistive devices.                  Learning Progress Summary             Patient Acceptance, E,TB, VU by MS at 7/9/2024 1636                         Point: Precautions (Done)       Description:   Instruct learner(s) on  prescribed precautions during self-care and functional transfers.                  Learning Progress Summary             Patient Acceptance, E,TB, VU by MS at 7/9/2024 1636                         Point: Body mechanics (Done)       Description:   Instruct learner(s) on proper positioning and spine alignment during self-care, functional mobility activities and/or exercises.                  Learning Progress Summary             Patient Acceptance, E,TB, VU by MS at 7/9/2024 1636                                         User Key       Initials Effective Dates Name Provider Type Discipline    MS 07/13/22 -  Marisel Finney OT Occupational Therapist OT                  OT Recommendation and Plan  Planned Therapy Interventions (OT): activity tolerance training, adaptive equipment training, BADL retraining, functional balance retraining, IADL retraining, occupation/activity based interventions, passive ROM/stretching, patient/caregiver education/training, transfer/mobility retraining, strengthening exercise, ROM/therapeutic exercise  Therapy Frequency (OT): 5 times/wk  Plan of Care Review  Plan of Care Reviewed With: patient  Progress: no change  Outcome Evaluation: Pt is a 80 y/o M admitted to [facility] 7/8/24 with c/o R hip pain, noted to have x2 falls prior to admission. Ortho following: recommending RLE NWB CAM boot 6 weeks. At baseline pt resides in ground level apartment, [address]. Pt typically (I) with ADLs, (I) with mobility with RW, also has cane if needed. Pt is an active . Pt has limited social support. This date pt A&Ox4 on RA sitting up in bed upon arrival, minimal pain with mobility. Pt educated on RLE NWB with CAM boot, verbalized understanding however does require significant verbal cues throughout to adhere to. Pt requires SBA for bed mobility, verbal cues to adhere to WBS, avoid pushing through RLE with bed mobility. Pt comes to standing with mod A x2 with RW and requires mod A x2 to transfer from bed to  chair with RW. Pt demo difficulty adhering to WBS upon initial stand, therefore chair was placed closer to bed to complete SPT, with PT placing foot under pt CAM boot to monitor NWB, continues to require mod A x2 with RW. Pt demo poor follow through with WBS, poor insight to deficits and poor problem solving. Pt is at significantly high risk for falls and is not safe to dc home at this time. OT recommend acute IP rehab when medically appropriate for dc, likely to progress well with transfer training, AE, and compensatory strategies to increase (I) to max potential. Pt noted to have limited support system, high risk for re-admission if dc home. OT will follow within acute setting.     Time Calculation:                   Marisel Finney OT  7/9/2024

## 2024-07-09 NOTE — PLAN OF CARE
Problem: Adult Inpatient Plan of Care  Goal: Absence of Hospital-Acquired Illness or Injury  Outcome: Ongoing, Progressing  Intervention: Identify and Manage Fall Risk  Recent Flowsheet Documentation  Taken 7/9/2024 1000 by Ena Rachel RN  Safety Promotion/Fall Prevention: safety round/check completed  Taken 7/9/2024 0800 by Ena Rachel RN  Safety Promotion/Fall Prevention:   safety round/check completed   fall prevention program maintained  Intervention: Prevent and Manage VTE (Venous Thromboembolism) Risk  Recent Flowsheet Documentation  Taken 7/9/2024 0800 by Ena Rachel RN  VTE Prevention/Management:   left   foot pump device on  Range of Motion: active ROM (range of motion) encouraged  Intervention: Prevent Infection  Recent Flowsheet Documentation  Taken 7/9/2024 1000 by Ena Rachel RN  Infection Prevention:   single patient room provided   hand hygiene promoted  Taken 7/9/2024 0800 by Ena Rachel RN  Infection Prevention:   single patient room provided   hand hygiene promoted   Goal Outcome Evaluation:  Plan of Care Reviewed With: patient        Progress: improving

## 2024-07-09 NOTE — PLAN OF CARE
Goal Outcome Evaluation:              Outcome Evaluation: Patient up to sips from ED, Consult called to ortho this morning.

## 2024-07-10 PROCEDURE — 97530 THERAPEUTIC ACTIVITIES: CPT

## 2024-07-10 PROCEDURE — 25010000002 MORPHINE PER 10 MG

## 2024-07-10 PROCEDURE — 97535 SELF CARE MNGMENT TRAINING: CPT

## 2024-07-10 RX ORDER — OXYCODONE HYDROCHLORIDE 5 MG/1
5 TABLET ORAL EVERY 6 HOURS PRN
Status: DISCONTINUED | OUTPATIENT
Start: 2024-07-10 | End: 2024-07-12 | Stop reason: HOSPADM

## 2024-07-10 RX ORDER — FAMOTIDINE 20 MG/1
20 TABLET, FILM COATED ORAL DAILY
Status: DISCONTINUED | OUTPATIENT
Start: 2024-07-11 | End: 2024-07-10

## 2024-07-10 RX ADMIN — OXYCODONE 5 MG: 5 TABLET ORAL at 15:44

## 2024-07-10 RX ADMIN — FAMOTIDINE 40 MG: 20 TABLET, FILM COATED ORAL at 08:41

## 2024-07-10 RX ADMIN — ACETAMINOPHEN 650 MG: 325 TABLET, FILM COATED ORAL at 04:47

## 2024-07-10 RX ADMIN — MORPHINE SULFATE 4 MG: 4 INJECTION, SOLUTION INTRAMUSCULAR; INTRAVENOUS at 02:30

## 2024-07-10 RX ADMIN — HYDROCODONE BITARTRATE AND ACETAMINOPHEN 1 TABLET: 7.5; 325 TABLET ORAL at 06:04

## 2024-07-10 RX ADMIN — ACETAMINOPHEN 650 MG: 325 TABLET, FILM COATED ORAL at 11:13

## 2024-07-10 RX ADMIN — OXYCODONE 5 MG: 5 TABLET ORAL at 21:48

## 2024-07-10 RX ADMIN — Medication 10 ML: at 21:48

## 2024-07-10 RX ADMIN — Medication 10 ML: at 08:42

## 2024-07-10 NOTE — PLAN OF CARE
Assessment: Yuan Salas presents with functional mobility impairments which indicate the need for skilled intervention. Tolerating session today without incident. Pt A&Ox4; however, demonstrates significant difficulty recalling and complying with weight bearing status, despite extensive and repetitive education on NWB status through RLE. Pt makes multiple attempts to place foot on floor and place weight through it throughout session, requiring PTA to manually keep foot off floor during functional transfers and gait training. Pt is not safe to return home and is at a high falls risk and risk for further injury to R ankle, as he is noncompliant with weight bearing restrictions. Pt would benefit from Acute IP Rehab for further education/reinforcement and gain functional independence. Will continue to follow and progress as tolerated.     Plan/Recommendations:   If medically appropriate, High Intensity Therapy recommended post-acute care. This is recommended as therapy feels the patient would require 5-6 days per week, 2-3 hours per day. At this time, inpatient rehabilitation (acute rehab) would be the first choice and SNF would be second. Pt requires no DME at discharge.     Pt desires Skilled Rehab placement at discharge. Pt cooperative; agreeable to therapeutic recommendations and plan of care.

## 2024-07-10 NOTE — THERAPY TREATMENT NOTE
Subjective: Pt agreeable to therapeutic plan of care. Upon arrival, pt found sitting at EOB attempting to don pants with R CAM boot donned, notably putting weight through RLE.     Objective:     Bed mobility - N/A or Not attempted.    Transfers - Multiple STS performed using RW.Pt requires Mod Ax2. Pt requires max cueing and education on appropriate technique, safety, and weight bearing restriction, with PTA manually keeping pt's R foot off floor to prevent pt from bearing weight through it. Limited compliance.     Ambulation - 3 feet mod-max Ax2 using RW. PTA kept pt's R foot off floor while OT provided upright support and managed RW. Pt with significant difficulty taking steps and turning while complying with weight bearing restriction, making multiple attempts to place foot on floor. Pt lost balance, requiring PTA and OT to assist pt into chair with max Ax2.     Vitals: WNL    Pain: Pt did not rate intensity of R ankle pain.     Education: Provided education on the importance of mobility in the acute care setting, Verbal/Tactile Cues, ADL training, Transfer Training, Gait Training, and WB'ing status    Assessment: Yuan Salas presents with functional mobility impairments which indicate the need for skilled intervention. Tolerating session today without incident. Pt A&Ox4; however, demonstrates significant difficulty recalling and complying with weight bearing status, despite extensive and repetitive education on NWB status through RLE. Pt makes multiple attempts to place foot on floor and place weight through it throughout session, requiring PTA to manually keep foot off floor during functional transfers and gait training. Pt is not safe to return home and is at a high falls risk and risk for further injury to R ankle, as he is noncompliant with weight bearing restrictions. Pt would benefit from Acute IP Rehab for further education/reinforcement and gain functional independence. Will continue to follow and  progress as tolerated.     Plan/Recommendations:   If medically appropriate, High Intensity Therapy recommended post-acute care. This is recommended as therapy feels the patient would require 5-6 days per week, 2-3 hours per day. At this time, inpatient rehabilitation (acute rehab) would be the first choice and SNF would be second. Pt requires no DME at discharge.     Pt desires Skilled Rehab placement at discharge. Pt cooperative; agreeable to therapeutic recommendations and plan of care.         Basic Mobility 6-click:  Rollin = Total, A lot = 2, A little = 3; 4 = None  Supine>Sit:   1 = Total, A lot = 2, A little = 3; 4 = None   Sit>Stand with arms:  1 = Total, A lot = 2, A little = 3; 4 = None  Bed>Chair:   1 = Total, A lot = 2, A little = 3; 4 = None  Ambulate in room:  1 = Total, A lot = 2, A little = 3; 4 = None  3-5 Steps with railin = Total, A lot = 2, A little = 3; 4 = None  Score: 10    Modified [place]: N/A = No pre-op stroke/TIA    Post-Tx Position: Up in Chair, Alarms activated, and Call light and personal items within reach  PPE: gloves

## 2024-07-10 NOTE — PLAN OF CARE
Goal Outcome Evaluation:                 Pt is resting quietly abed at this time. Pain is controled at this time. Pt educated to request pain meds before pain reached an intolerable level. Plans continue to dc to SNF upon discharge. VSS. Care plan is on going.

## 2024-07-10 NOTE — PROGRESS NOTES
LOS: 0 days   Patient Care Team:  Sonia Valadez MD as PCP - General (Internal Medicine)    Subjective     C/o of leg pain    Review of Systems   Constitutional:  Positive for activity change, appetite change and fatigue.   HENT: Negative.     Respiratory: Negative.     Cardiovascular:  Positive for leg swelling.   Gastrointestinal: Negative.    Genitourinary: Negative.    Musculoskeletal:  Positive for gait problem.   Neurological:  Positive for weakness.   Psychiatric/Behavioral: Negative.             Objective     Vital Signs  Temp:  [97.3 °F (36.3 °C)-97.7 °F (36.5 °C)] 97.5 °F (36.4 °C)  Heart Rate:  [64-80] 64  Resp:  [15-16] 15  BP: (113-149)/(63-76) 139/63      Physical Exam  Vitals reviewed.   Constitutional:       Appearance: He is not ill-appearing.   HENT:      Head: Normocephalic and atraumatic.      Right Ear: External ear normal.      Left Ear: External ear normal.      Nose: Nose normal.      Mouth/Throat:      Mouth: Mucous membranes are moist.   Eyes:      General:         Right eye: No discharge.         Left eye: No discharge.   Cardiovascular:      Rate and Rhythm: Normal rate and regular rhythm.      Pulses: Normal pulses.      Heart sounds: Normal heart sounds.   Pulmonary:      Effort: Pulmonary effort is normal.      Breath sounds: Normal breath sounds.   Abdominal:      General: Bowel sounds are normal.      Palpations: Abdomen is soft.   Musculoskeletal:         General: Normal range of motion.      Cervical back: Normal range of motion.   Skin:     General: Skin is warm and dry.   Neurological:      Mental Status: He is alert and oriented to person, place, and time.   Psychiatric:         Behavior: Behavior normal.              Results Review:    Lab Results (last 24 hours)       Procedure Component Value Units Date/Time    Blood Culture - Blood, Arm, Left [[id number]]  (Normal) Collected: 07/08/24 1921    Specimen: Blood from Arm, Left Updated: 07/09/24 1931     Blood Culture No growth  at 24 hours             Imaging Results (Last 24 Hours)       ** No results found for the last 24 hours. **                 I reviewed the patient's new clinical results.    Medication Review:   Scheduled Meds:famotidine, 40 mg, Oral, Daily  sodium chloride, 10 mL, Intravenous, Q12H      Continuous Infusions:   PRN Meds:.  acetaminophen    senna-docusate sodium **AND** polyethylene glycol **AND** bisacodyl **AND** bisacodyl    Calcium Replacement - Follow Nurse / BPA Driven Protocol    HYDROcodone-acetaminophen    Magnesium Standard Dose Replacement - Follow Nurse / BPA Driven Protocol    ondansetron    Phosphorus Replacement - Follow Nurse / BPA Driven Protocol    Potassium Replacement - Follow Nurse / BPA Driven Protocol    [COMPLETED] Insert Peripheral IV **AND** sodium chloride    sodium chloride    [COMPLETED] Insert Peripheral IV **AND** sodium chloride    sodium chloride    sodium chloride     Interval History:    Assessment & Plan     Right hip pain  - difficulty bearing weight  -MRI negative for acute findings     Right fibula fracture  -per ortho very minimally displaced and could be amenable to nonoperative management which would be placement of a cast and nonweightbearing for a total of 6 weeks. I will see him in clinic and place this cast. 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 If no old outside studies are available for comparison, short interval follow-up CT scan or brain MRI could be considered      Leukocytosis/afebrile with no compalints - UA neg, respiratory panel neg, chest xray ne, blood cultures     Chronic insomnia - holding Ambien given altered mental status     DM-II - consistent  carb diet; check A1C 6.55     CKD stage 3 - creatinine is at baseline; avoid nephrotoxins       Plan for disposition:In rehab    LANI Cerda  07/10/24  09:49 EDT

## 2024-07-10 NOTE — THERAPY TREATMENT NOTE
Subjective: Pt agreeable to therapeutic plan of care. Pt pleasantly confused, unable to recall WBS, situation, place and demo difficulty participating in conversation related to dc recommendations. Pt demo decreased divided attention, highly distractibility, and difficulty attending to tasks. Pt sitting edge of bed attempting to don pants upon arrival, CAM boot donned.    Short Blessed Test: 0/28 indicating normal cognition     Cognition: oriented to Person and Time, safety/judgement: poor, and awareness of deficits: poor awareness of safety precaution and poor awareness of defits, pt disoriented to full situation, requiring additional education and explanation of acuity of RLE fx and importance of NWB, pt appears to understand he is in the hospital at times, other times appears confused of location     Objective:     Bed Mobility: N/A or Not attempted.   Functional Transfers: Mod-A, Assist x 2, and with rolling walker     Balance: supported, static, with UE support, and standing Mod-A and with rolling walker  Functional Ambulation: Mod-A, Max-A, Assist x 2, and with rolling walker    Lower Body Dressing: Max-A  ADL Position: edge of bed sitting and supported standing  ADL Comments: max A don/doff CAM boot, max A don/doff sock, max A thread pants around RLE (LLE threaded upon arrival) and to pull up pants     Vitals: WNL    Pain: 3 VAS  Location: chronic back pain, no c/o pain in RLE   Interventions for pain: Repositioned, Increased Activity, and Therapeutic Presence  Education: Provided education on the importance of mobility in the acute care setting, Verbal/Tactile Cues, ADL training, Transfer Training, and WB'ing status      Assessment: Yuan Salas presents with ADL impairments affecting function including balance, cognition, endurance / activity tolerance, pain, postural / trunk control, range of motion (ROM), and strength. Demonstrated functioning below baseline abilities indicate the need for continued  skilled intervention while inpatient. Pt demo cognitive deficits, pt oriented to month/year, name and occasionally appears to understand place, however demo executive function deficits. Pt highly distractible, poor attention to task, poor problem solving, and decreased divided attention. Pt noted to be sitting edge of bed upon arrival, attempting to don pants, notably bearing weight through RLE. Pt requires continuous reminders to adhere to NWB status, however pt demo limited understanding. Pt is at high risk for further injuring RLE, is not safe to dc home at this time. Pt demo good activity tolerance and likely to tolerate high intensity with therapy, OT continues to recommend acute IP rehab when medically appropriate for dc. Tolerating session today without incident. Will continue to follow and progress as tolerated.     Plan/Recommendations:   High Intensity Therapy recommended post-acute care. This is recommended as therapy feels the patient would require 5-6 days per week, 2-3 hours per day. At this time, inpatient rehabilitation (acute rehab) would be the first choice and SNF would be second.. Pt requires no DME at discharge.     Pt desires Inpatient Rehabilitation placement at discharge. Pt cooperative; agreeable to therapeutic recommendations and plan of care.     Modified [place]: N/A = No pre-op stroke/TIA    Post-Tx Position: Up in Chair, Alarms activated, and Call light and personal items within reach  PPE: gloves

## 2024-07-10 NOTE — DISCHARGE PLACEMENT REQUEST
"Vinh Rich (79 y.o. Male)       Date of Birth   1945    Social Security Number       Address   [address] [place] [address] [address] [place] [facility]    Home Phone   [phone]    MRN   [id number]       Latter-day   Unknown    Marital Status   Unknown                            Admission Date   7/8/24    Admission Type   Emergency    Admitting Provider   Sonia Valadez MD    Attending Provider   Sonia Valadez MD    Department, Room/Bed   [facility] SURGICAL INPATIENT, 4123/1       Discharge Date       Discharge Disposition       Discharge Destination                                 Attending Provider: Sonia Valadez MD    Allergies: No Known Allergies    Isolation: None   Infection: None   Code Status: CPR    Ht: 172.7 cm (68\")   Wt: 82.4 kg (181 lb 10.5 oz)    Admission Cmt: None   Principal Problem: Right hip pain [M25.551]                   Active Insurance as of 7/8/2024       Primary Coverage       Payor Plan Insurance Group Employer/Plan Group    [facility] MEDICARE REPLACEMENT AARP MED ADV HMO [id number]       Payor Plan Address Payor Plan Phone Number Payor Plan Fax Number Effective Dates    [address]   3/1/2024 - None Entered    [facility] [id number]         Subscriber Name Subscriber Birth Date Member ID       VINH RICH 1945 [id number]                     Emergency Contacts        (Rel.) Home Phone Work Phone Mobile Phone    Lacie Bautista (Friend) -- -- [phone]                 History & Physical        Sonia Valadez MD at 07/08/24 1832              Patient Care Team:  Sonia Valadez MD as PCP - General (Internal Medicine)    Chief complaint Right hip and ankle pain    Subjective    Patient is a 79 y.o. male with h/o CKD stage 3, diabetes, chronic insomnia with dependence on Ambien who presents with 2 week history of right hip pain with frequent falls.  He is confused and can not provide details, other than telling me has fallen several times.  A family friend " provides additional history that at least twice he has fallen and has required assistance to get up off the floor.  He was evaluated recently at urgent care for right hip pain and had normal xray.  He was given steroids and muscle relaxer without significant improvement.  Last night he fell again and injured his right ankle and is unable to bear weight.  He has declined over the last few months since the death of his dog with deteriorating memory, no exercise and eating only fast food.  He sleeps most of the day and stays awake at night.      In the ER, ct of hip suggests a possible nondisplaced femoral neck fracture.  Right ankle films show a distal fibular fracture.  WBC count is elevated.    Review of Systems   Constitutional:  Positive for activity change and fatigue. Negative for appetite change, chills, diaphoresis, fever and unexpected weight change.   HENT:  Negative for facial swelling.    Eyes:  Negative for visual disturbance.   Respiratory:  Negative for cough, shortness of breath, wheezing and stridor.    Cardiovascular:  Negative for chest pain, palpitations and leg swelling.   Gastrointestinal:  Negative for abdominal pain, constipation, diarrhea, nausea and vomiting.   Genitourinary:  Negative for enuresis, flank pain, frequency and urgency.   Musculoskeletal:  Positive for arthralgias, gait problem and myalgias. Negative for neck pain and neck stiffness.   Skin:  Positive for wound. Negative for rash.   Neurological:  Negative for weakness.   Psychiatric/Behavioral:  Positive for confusion.           History  History reviewed. No pertinent past medical history.  History reviewed. No pertinent surgical history.  History reviewed. No pertinent family history.     (Not in a hospital admission)    Allergies:  Patient has no known allergies.    Objective    Vital Signs  Temp:  [97.8 °F (36.6 °C)] 97.8 °F (36.6 °C)  Heart Rate:  [79-96] 79  Resp:  [18] 18  BP: (129-151)/(61-94) 129/61     Physical  Exam:      General Appearance:    Alert, cooperative, in no acute distress   Head:    Normocephalic, without obvious abnormality, atraumatic   Eyes:            Lids and lashes normal, conjunctivae and sclerae normal, no   icterus, no pallor, corneas clear, PERRLA   Ears:    Ears appear intact with no abnormalities noted   Throat:   No oral lesions, no thrush, oral mucosa moist   Neck:   No adenopathy, supple, trachea midline, no thyromegaly, no   carotid bruit, no JVD   Lungs:     Clear to auscultation,respirations regular, even and                  unlabored    Heart:    Irregular   Chest Wall:    No abnormalities observed   Abdomen:     Normal bowel sounds, no masses, no organomegaly, soft        non-tender, non-distended, no guarding, no rebound                tenderness   Extremities:   Right foot - marked STS, ecchymosis and tenderness of lateral malleolus ; limited ROM in right hip with IR/ER, no specific pain elicited with IR/ER   Pulses:   Pulses palpable and equal bilaterally   Skin:   Scattered superficial ecchymoses of varying ages on all 4 extremities   Lymph nodes:   No palpable adenopathy   Neurologic:   Oriented to person, place, year; not month; appears dazed.  No focal neurologic deficits.       Results Review:     Imaging Results (Last 24 Hours)       Procedure Component Value Units Date/Time    CT Lower Extremity Right Without Contrast [[id number]] Collected: 07/08/24 1646     Updated: 07/08/24 1653    Narrative:      CT LOWER EXTREMITY RIGHT WO CONTRAST    Date of Exam: 7/8/2024 4:42 PM EDT    Indication: right hip pain/ falls neg xray.    Comparison: None available.    Technique: Axial CT images were obtained of the right lower extremity without contrast administration.  Sagittal and coronal reconstructions were performed.  Automated exposure control and iterative reconstruction methods were used.      Findings:  Osteopenia limits evaluation for nondisplaced fractures. If concern for  radiographically occult fracture MRI study of choice. There is questionable nondisplaced fracture at the femoral neck best seen on the axial image #158. Avascular channel cannot be   excluded. MRI of the hip is recommended if patient has difficulty weightbearing or pain.    There is moderate degenerative change of the hip joint. There are no aggressive osseous lesions.    No significant joint effusion is seen on CT. There are vascular calcifications. There are diverticuli in the colon.      Impression:      Impression:  CT of the hip is nondiagnostic for hip fracture. There is a questionable nondisplaced fracture of the femoral neck could be seen with nondisplaced fracture or vascular groove. If patient has difficulty weightbearing or pain, MRI of the hip is recommended   for better evaluation.  Osteopenia limits evaluation.  Moderate arthritis of the hip.        Electronically Signed: Oralia Tanner MD    7/8/2024 4:50 PM EDT    Workstation ID: [id number]    XR Ankle 3+ View Right [[id number]] Collected: 07/08/24 1526     Updated: 07/08/24 1530    Narrative:      XR ANKLE 3+ VW RIGHT    Date of Exam: 7/8/2024 3:18 PM EDT    Indication: pain after falling    Comparison: None available.    Findings:  Minimally displaced fracture through the distal fibula/lateral malleolus. The remainder of the osseous structures are intact.    The ankle mortise is intact.  Mild degenerative changes of the ankle.    Soft tissue edema surrounding the ankle.      Impression:      Impression:  Minimally displaced fracture through the distal fibula/lateral malleolus.      Electronically Signed: Walker Farfan DO    7/8/2024 3:28 PM EDT    Workstation ID: [id number]             Lab Results (last 24 hours)       Procedure Component Value Units Date/Time    COVID-19,CEPHEID/SELMA,COR/ANASTASIA/PAD/MITESH/LAG/MARY IN-HOUSE,NP SWAB IN TRANSPORT MEDIA 1 HR TAT, RT-PCR - Swab, Nasopharynx [[id number]] Collected: 07/08/24 1801    Specimen: Swab from Nasopharynx  Updated: 07/08/24 1804    Comprehensive Metabolic Panel [[id number]]  (Abnormal) Collected: 07/08/24 1459    Specimen: Blood Updated: 07/08/24 1751     Glucose 141 mg/dL      BUN 33 mg/dL      Creatinine 1.87 mg/dL      Sodium 141 mmol/L      Potassium 3.7 mmol/L      Chloride 104 mmol/L      CO2 21.1 mmol/L      Calcium 9.7 mg/dL      Total Protein 7.7 g/dL      Albumin 4.6 g/dL      ALT (SGPT) 26 U/L      AST (SGOT) 26 U/L      Alkaline Phosphatase 134 U/L      Total Bilirubin 0.5 mg/dL      Globulin 3.1 gm/dL      A/G Ratio 1.5 g/dL      BUN/Creatinine Ratio 17.6     Anion Gap 15.9 mmol/L      eGFR 36.3 mL/min/1.73     Narrative:      GFR Normal >60  Chronic Kidney Disease <60  Kidney Failure <15    The GFR formula is only valid for adults with stable renal function between ages 18 and 70.    CBC & Differential [[id number]]  (Abnormal) Collected: 07/08/24 1459    Specimen: Blood Updated: 07/08/24 1710    Narrative:      The following orders were created for panel order CBC & Differential.  Procedure                               Abnormality         Status                     ---------                               -----------         ------                     CBC Auto Differential[[id number]]        Abnormal            Final result                 Please view results for these tests on the individual orders.    CBC Auto Differential [[id number]]  (Abnormal) Collected: 07/08/24 1459    Specimen: Blood Updated: 07/08/24 1710     WBC 20.35 10*3/mm3      RBC 4.77 10*6/mm3      Hemoglobin 15.2 g/dL      Hematocrit 45.9 %      MCV 96.2 fL      MCH 31.9 pg      MCHC 33.1 g/dL      RDW 16.0 %      RDW-SD 57.6 fl      MPV 10.7 fL      Platelets 469 10*3/mm3      Neutrophil % 86.5 %      Lymphocyte % 4.3 %      Monocyte % 8.3 %      Eosinophil % 0.0 %      Basophil % 0.1 %      Immature Grans % 0.8 %      Neutrophils, Absolute 17.59 10*3/mm3      Lymphocytes, Absolute 0.88 10*3/mm3      Monocytes, Absolute 1.69 10*3/mm3       Eosinophils, Absolute 0.00 10*3/mm3      Basophils, Absolute 0.03 10*3/mm3      Immature Grans, Absolute 0.16 10*3/mm3      nRBC 0.0 /100 WBC     [place] Draw [[id number]] Collected: 07/08/24 1459    Specimen: Blood Updated: 07/08/24 1515    Narrative:      The following orders were created for panel order [place] Draw.  Procedure                               Abnormality         Status                     ---------                               -----------         ------                     Green Top (Gel)[[id number]]                                  Final result               Lavender Top[[id number]]                                     Final result               Gold Top - SST[[id number]]                                   Final result               Light Blue Top[[id number]]                                   Final result                 Please view results for these tests on the individual orders.    Green Top (Gel) [[id number]] Collected: 07/08/24 1459    Specimen: Blood Updated: 07/08/24 1515     Extra Tube Hold for add-ons.     Comment: Auto resulted.       Lavender Top [[id number]] Collected: 07/08/24 1459    Specimen: Blood Updated: 07/08/24 1515     Extra Tube hold for add-on     Comment: Auto resulted       Gold Top - SST [[id number]] Collected: 07/08/24 1459    Specimen: Blood Updated: 07/08/24 1515     Extra Tube Hold for add-ons.     Comment: Auto resulted.       Light Blue Top [[id number]] Collected: 07/08/24 1459    Specimen: Blood Updated: 07/08/24 1515     Extra Tube Hold for add-ons.     Comment: Auto resulted                I reviewed the patient's new clinical results.    Assessment & Plan      Right hip pain  - difficulty bearing weight; questionable abnormality on ct scan.  MRI is pending.  Keep NWB until further testing completed.    Right fibula fracture - anticipate conservative management; ice, elevate, will need walking boot once swelling reduces    Altered mental status - concerning for possible  UTI or other infection, hepatic encephalopathy, substance use, NPH, SHD, etc.  Head ct pending.  UA, urine tox screen pending.  Check ammonia level.    Leukocytosis - checking UA, respiratory panel, chest xray, blood cultures    Chronic insomnia - holding Ambien given altered mental status    DM-II - consistent carb diet; check A1C    CKD stage 3 - creatinine is at baseline; avoid nephrotoxins    Scd's for dvt prophy  Famotidine for stress ulcer prophy            I discussed the patient's findings and my recommendations with patient.     Sonia Valadez MD  07/08/24  18:32 EDT        Electronically signed by Sonia Valadez MD at 07/08/24 3954       Operative/Procedure Notes (all)    No notes of this type exist for this encounter.          Physician Progress Notes (last 48 hours)        Corie Ny APRN at 07/10/24 0938       Attestation signed by Melanie Brar MD at 07/10/24 1510    I have reviewed this documentation and agree.                       LOS: 0 days   Patient Care Team:  Sonia Valadez MD as PCP - General (Internal Medicine)    Subjective     C/o of leg pain    Review of Systems   Constitutional:  Positive for activity change, appetite change and fatigue.   HENT: Negative.     Respiratory: Negative.     Cardiovascular:  Positive for leg swelling.   Gastrointestinal: Negative.    Genitourinary: Negative.    Musculoskeletal:  Positive for gait problem.   Neurological:  Positive for weakness.   Psychiatric/Behavioral: Negative.             Objective     Vital Signs  Temp:  [97.3 °F (36.3 °C)-97.7 °F (36.5 °C)] 97.5 °F (36.4 °C)  Heart Rate:  [64-80] 64  Resp:  [15-16] 15  BP: (113-149)/(63-76) 139/63      Physical Exam  Vitals reviewed.   Constitutional:       Appearance: He is not ill-appearing.   HENT:      Head: Normocephalic and atraumatic.      Right Ear: External ear normal.      Left Ear: External ear normal.      Nose: Nose normal.      Mouth/Throat:      Mouth: Mucous membranes are moist.    Eyes:      General:         Right eye: No discharge.         Left eye: No discharge.   Cardiovascular:      Rate and Rhythm: Normal rate and regular rhythm.      Pulses: Normal pulses.      Heart sounds: Normal heart sounds.   Pulmonary:      Effort: Pulmonary effort is normal.      Breath sounds: Normal breath sounds.   Abdominal:      General: Bowel sounds are normal.      Palpations: Abdomen is soft.   Musculoskeletal:         General: Normal range of motion.      Cervical back: Normal range of motion.   Skin:     General: Skin is warm and dry.   Neurological:      Mental Status: He is alert and oriented to person, place, and time.   Psychiatric:         Behavior: Behavior normal.              Results Review:    Lab Results (last 24 hours)       Procedure Component Value Units Date/Time    Blood Culture - Blood, Arm, Left [[id number]]  (Normal) Collected: 07/08/24 1921    Specimen: Blood from Arm, Left Updated: 07/09/24 1931     Blood Culture No growth at 24 hours             Imaging Results (Last 24 Hours)       ** No results found for the last 24 hours. **                 I reviewed the patient's new clinical results.    Medication Review:   Scheduled Meds:famotidine, 40 mg, Oral, Daily  sodium chloride, 10 mL, Intravenous, Q12H      Continuous Infusions:   PRN Meds:.  acetaminophen    senna-docusate sodium **AND** polyethylene glycol **AND** bisacodyl **AND** bisacodyl    Calcium Replacement - Follow Nurse / BPA Driven Protocol    HYDROcodone-acetaminophen    Magnesium Standard Dose Replacement - Follow Nurse / BPA Driven Protocol    ondansetron    Phosphorus Replacement - Follow Nurse / BPA Driven Protocol    Potassium Replacement - Follow Nurse / BPA Driven Protocol    [COMPLETED] Insert Peripheral IV **AND** sodium chloride    sodium chloride    [COMPLETED] Insert Peripheral IV **AND** sodium chloride    sodium chloride    sodium chloride     Interval History:    Assessment & Plan     Right hip pain  -  difficulty bearing weight  -MRI negative for acute findings     Right fibula fracture  -per ortho very minimally displaced and could be amenable to nonoperative management which would be placement of a cast and nonweightbearing for a total of 6 weeks. I will see him in clinic and place this cast. 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 If no old outside studies are available for comparison, short interval follow-up CT scan or brain MRI could be considered      Leukocytosis/afebrile with no compalints - UA neg, respiratory panel neg, chest xray ne, blood cultures     Chronic insomnia - holding Ambien given altered mental status     DM-II - consistent carb diet; check A1C 6.55     CKD stage 3 - creatinine is at baseline; avoid nephrotoxins       Plan for disposition:Inpt rehab    LANI Cerda  07/10/24  09:49 EDT          Electronically signed by Melanie Brar MD at 07/10/24 1510       Melva Jones APRN at 07/09/24 1124       Attestation signed by Sonia Valadez MD at 07/09/24 1700    I have reviewed this documentation and agree.                       LOS: 0 days   Patient Care Team:  Sonia Valadez MD as PCP - General (Internal Medicine)    Subjective     C/o of leg pain    Review of Systems   Constitutional:  Positive for activity change, appetite change and fatigue.   HENT: Negative.     Respiratory: Negative.     Cardiovascular:  Positive for leg swelling.   Gastrointestinal: Negative.    Genitourinary: Negative.    Musculoskeletal:  Positive for gait problem.   Neurological:  Positive for weakness.   Psychiatric/Behavioral: Negative.             Objective     Vital Signs  Temp:  [97.4 °F (36.3  °C)-97.8 °F (36.6 °C)] 97.4 °F (36.3 °C)  Heart Rate:  [65-96] 65  Resp:  [16-18] 16  BP: (117-151)/(61-94) 117/70      Physical Exam  Vitals reviewed.   Constitutional:       Appearance: He is not ill-appearing.   HENT:      Head: Normocephalic and atraumatic.      Right Ear: External ear normal.      Left Ear: External ear normal.      Nose: Nose normal.      Mouth/Throat:      Mouth: Mucous membranes are moist.   Eyes:      General:         Right eye: No discharge.         Left eye: No discharge.   Cardiovascular:      Rate and Rhythm: Normal rate and regular rhythm.      Pulses: Normal pulses.      Heart sounds: Normal heart sounds.   Pulmonary:      Effort: Pulmonary effort is normal.      Breath sounds: Normal breath sounds.   Abdominal:      General: Bowel sounds are normal.      Palpations: Abdomen is soft.   Musculoskeletal:         General: Normal range of motion.      Cervical back: Normal range of motion.   Skin:     General: Skin is warm and dry.   Neurological:      Mental Status: He is alert and oriented to person, place, and time.   Psychiatric:         Behavior: Behavior normal.              Results Review:    Lab Results (last 24 hours)       Procedure Component Value Units Date/Time    Urine Drug Screen - Urine, Clean Catch [[id number]]  (Abnormal) Collected: 07/09/24 0756    Specimen: Urine, Clean Catch Updated: 07/09/24 0829     Amphet/Methamphet, Screen Negative     Barbiturates Screen, Urine Negative     Benzodiazepine Screen, Urine Negative     Cocaine Screen, Urine Negative     Opiate Screen Positive     THC, Screen, Urine Negative     Methadone Screen, Urine Negative     Oxycodone Screen, Urine Negative    Narrative:      Negative Thresholds Per Drugs Screened:    Amphetamines                 500 ng/ml  Barbiturates                 200 ng/ml  Benzodiazepines              100 ng/ml  Cocaine                      300 ng/ml  Methadone                    300 ng/ml  Opiates                       300 ng/ml  Oxycodone                    100 ng/ml  THC                           50 ng/ml    The Normal Value for all drugs tested is negative. This report includes final unconfirmed screening results to be used for medical treatment purposes only. Unconfirmed results must not be used for non-medical purposes such as employment or legal testing. Clinical consideration should be applied to any drug of abuse test, particularly when unconfirmed results are used.          All urine drugs of abuse requests without chain of custody are for medical screening purposes only.  False positives are possible.      Urinalysis With Culture If Indicated - Urine, Clean Catch [[id number]]  (Abnormal) Collected: 07/09/24 0756    Specimen: Urine, Clean Catch Updated: 07/09/24 0808     Color, UA Yellow     Appearance, UA Clear     pH, UA <=5.0     Specific Gravity, UA 1.028     Glucose, UA >=1000 mg/dL (3+)     Ketones, UA Negative     Bilirubin, UA Negative     Blood, UA Negative     Protein, UA Trace     Leuk Esterase, UA Negative     Nitrite, UA Negative     Urobilinogen, UA 0.2 E.U./dL    Narrative:      In absence of clinical symptoms, the presence of pyuria, bacteria, and/or nitrites on the urinalysis result does not correlate with infection.  Urine microscopic not indicated.    POC Glucose Once [[id number]]  (Abnormal) Collected: 07/08/24 2111    Specimen: Blood Updated: 07/08/24 2113     Glucose 192 mg/dL      Comment: Serial Number: [id number]:  [id number]       Respiratory Panel PCR w/COVID-19(SARS-CoV-2) ALFONSO/POOL/ANASTASIA/PAD/COR/MARY In-House, NP Swab in UTM/[facility], 2 HR TAT - Swab, Nasopharynx [[id number]]  (Normal) Collected: 07/08/24 1921    Specimen: Swab from Nasopharynx Updated: 07/08/24 2015     ADENOVIRUS, PCR Not Detected     Coronavirus 229E Not Detected     Coronavirus HKU1 Not Detected     Coronavirus NL63 Not Detected     Coronavirus OC43 Not Detected     COVID19 Not Detected     Human Metapneumovirus Not Detected      Human Rhinovirus/Enterovirus Not Detected     Influenza A PCR Not Detected     Influenza B PCR Not Detected     Parainfluenza Virus 1 Not Detected     Parainfluenza Virus 2 Not Detected     Parainfluenza Virus 3 Not Detected     Parainfluenza Virus 4 Not Detected     RSV, PCR Not Detected     Bordetella pertussis pcr Not Detected     Bordetella parapertussis PCR Not Detected     Chlamydophila pneumoniae PCR Not Detected     Mycoplasma pneumo by PCR Not Detected    Narrative:      In the setting of a positive respiratory panel with a viral infection PLUS a negative procalcitonin without other underlying concern for bacterial infection, consider observing off antibiotics or discontinuation of antibiotics and continue supportive care. If the respiratory panel is positive for atypical bacterial infection (Bordetella pertussis, Chlamydophila pneumoniae, or Mycoplasma pneumoniae), consider antibiotic de-escalation to target atypical bacterial infection.    Ammonia [[id number]]  (Normal) Collected: 07/08/24 1921    Specimen: Blood Updated: 07/08/24 1947     Ammonia 17 umol/L     CK [[id number]]  (Normal) Collected: 07/08/24 1459    Specimen: Blood Updated: 07/08/24 1931     Creatine Kinase 138 U/L     Blood Culture - Blood, Arm, Left [[id number]] Collected: 07/08/24 1921    Specimen: Blood from Arm, Left Updated: 07/08/24 1925    Hemoglobin A1c [[id number]]  (Abnormal) Collected: 07/08/24 1459    Specimen: Blood Updated: 07/08/24 1851     Hemoglobin A1C 6.55 %     COVID-19,CEPHEID/SELMA,COR/ANASTASIA/PAD/MITESH/LAG/MARY IN-HOUSE,NP SWAB IN TRANSPORT MEDIA 1 HR TAT, RT-PCR - Swab, Nasopharynx [[id number]]  (Normal) Collected: 07/08/24 1801    Specimen: Swab from Nasopharynx Updated: 07/08/24 1844     COVID19 Not Detected    Narrative:      Fact sheet for providers: https://www.fda.gov/media/755349/download     Fact sheet for patients: https://www.fda.gov/media/567967/download  Fact sheet for providers:  https://www.fda.gov/media/579344/download    Fact sheet for patients: https://www.fda.gov/media/203822/download    Test performed by PCR.    Comprehensive Metabolic Panel [[id number]]  (Abnormal) Collected: 07/08/24 1459    Specimen: Blood Updated: 07/08/24 1751     Glucose 141 mg/dL      BUN 33 mg/dL      Creatinine 1.87 mg/dL      Sodium 141 mmol/L      Potassium 3.7 mmol/L      Chloride 104 mmol/L      CO2 21.1 mmol/L      Calcium 9.7 mg/dL      Total Protein 7.7 g/dL      Albumin 4.6 g/dL      ALT (SGPT) 26 U/L      AST (SGOT) 26 U/L      Alkaline Phosphatase 134 U/L      Total Bilirubin 0.5 mg/dL      Globulin 3.1 gm/dL      A/G Ratio 1.5 g/dL      BUN/Creatinine Ratio 17.6     Anion Gap 15.9 mmol/L      eGFR 36.3 mL/min/1.73     Narrative:      GFR Normal >60  Chronic Kidney Disease <60  Kidney Failure <15    The GFR formula is only valid for adults with stable renal function between ages 18 and 70.    CBC & Differential [[id number]]  (Abnormal) Collected: 07/08/24 1459    Specimen: Blood Updated: 07/08/24 1710    Narrative:      The following orders were created for panel order CBC & Differential.  Procedure                               Abnormality         Status                     ---------                               -----------         ------                     CBC Auto Differential[[id number]]        Abnormal            Final result                 Please view results for these tests on the individual orders.    CBC Auto Differential [[id number]]  (Abnormal) Collected: 07/08/24 1459    Specimen: Blood Updated: 07/08/24 1710     WBC 20.35 10*3/mm3      RBC 4.77 10*6/mm3      Hemoglobin 15.2 g/dL      Hematocrit 45.9 %      MCV 96.2 fL      MCH 31.9 pg      MCHC 33.1 g/dL      RDW 16.0 %      RDW-SD 57.6 fl      MPV 10.7 fL      Platelets 469 10*3/mm3      Neutrophil % 86.5 %      Lymphocyte % 4.3 %      Monocyte % 8.3 %      Eosinophil % 0.0 %      Basophil % 0.1 %      Immature Grans % 0.8 %       Neutrophils, Absolute 17.59 10*3/mm3      Lymphocytes, Absolute 0.88 10*3/mm3      Monocytes, Absolute 1.69 10*3/mm3      Eosinophils, Absolute 0.00 10*3/mm3      Basophils, Absolute 0.03 10*3/mm3      Immature Grans, Absolute 0.16 10*3/mm3      nRBC 0.0 /100 WBC     [place] Draw [[id number]] Collected: 07/08/24 1459    Specimen: Blood Updated: 07/08/24 1515    Narrative:      The following orders were created for panel order [place] Draw.  Procedure                               Abnormality         Status                     ---------                               -----------         ------                     Green Top (Gel)[[id number]]                                  Final result               Lavender Top[[id number]]                                     Final result               Gold Top - SST[[id number]]                                   Final result               Light Blue Top[[id number]]                                   Final result                 Please view results for these tests on the individual orders.    Green Top (Gel) [[id number]] Collected: 07/08/24 1459    Specimen: Blood Updated: 07/08/24 1515     Extra Tube Hold for add-ons.     Comment: Auto resulted.       Lavender Top [[id number]] Collected: 07/08/24 1459    Specimen: Blood Updated: 07/08/24 1515     Extra Tube hold for add-on     Comment: Auto resulted       Gold Top - SST [[id number]] Collected: 07/08/24 1459    Specimen: Blood Updated: 07/08/24 1515     Extra Tube Hold for add-ons.     Comment: Auto resulted.       Light Blue Top [[id number]] Collected: 07/08/24 1459    Specimen: Blood Updated: 07/08/24 1515     Extra Tube Hold for add-ons.     Comment: Auto resulted                Imaging Results (Last 24 Hours)       Procedure Component Value Units Date/Time    XR Ankle 3+ View Right [[id number]] Collected: 07/09/24 0852     Updated: 07/09/24 0857    Narrative:      XR ANKLE 3+ VW RIGHT    Date of Exam: 7/9/2024 8:39 AM EDT    Indication: in  boot to see if fibula fracture moved    Comparison: 7/8/2024.    Findings: 3 views through both. Fracture at the base of the lateral malleolus appears unchanged in alignment with slight lateral displacement of distal fracture fragments in relation to proximal. There is a small avulsion from the tip of the medial   malleolus although may be old. The ankle joint is maintained.      Impression:      Impression:  Distal fibular fracture is unchanged in alignment.      Electronically Signed: Landy Stockton MD    7/9/2024 8:54 AM EDT    Workstation ID: [id number]    CT Head Without Contrast [[id number]] Collected: 07/09/24 0722     Updated: 07/09/24 0736    Narrative:      CT HEAD WO CONTRAST    Date of Exam: 7/9/2024 3:50 AM EDT    Indication: frequent falls, unwitnessed.    Comparison: None available.    Technique: Axial CT images were obtained of the head without contrast administration.  Coronal reconstructions were performed.  Automated exposure control and iterative reconstruction methods were used.      Findings:  The calvarium appears intact. Included paranasal sinuses and mastoids appear clear. Soft tissue axial images show expected degree of generalized cerebral atrophy for the patient's age. No evidence of acute infarction, mass, mass effect or hydrocephalus   is seen.     A small curvilinear, extra-axial intermediate density along the left frontotemporal convexity, initial axial images 24 through 27, coronal images 26 and 25, is favored to represent a small dural varix rather than an unusual small aging subdural hematoma,   and there is no evidence of subdural hematoma or other evidence of hemorrhage elsewhere. There are chronic appearing central white matter changes, mostly in the left frontal white matter, but no obvious infarction..      Impression:      Impression:    1. Generalized cerebral atrophy, expected for age.    2. Small extra-axial density along the left frontotemporal convexity favored to  represent a small incidental dural varix, rather than meningioma or trace aging subdural hematoma. If no old outside studies are available for comparison, short interval   follow-up CT scan or brain MRI could be considered.      Electronically Signed: Boo Lundy MD    7/9/2024 7:34 AM EDT    Workstation ID: [id number]    MRI Hip Right Without Contrast [[id number]] Collected: 07/09/24 0730     Updated: 07/09/24 0736    Narrative:      MRI HIP RIGHT WO CONTRAST    Date of Exam: 7/8/2024 7:55 PM EDT    Indication: right hip pain, frequent falls, abnormal ct.     Comparison: Hip CT 7/8/2024    Technique:  Routine multiplanar/multisequence images of the right hip were obtained without contrast administration.        Findings:  OSSEOUS STRUCTURES  No fracture, stress reaction, avascular necrosis, or focal osseous lesion is seen. Bone marrow signal intensity is normal. Moderate joint space narrowing both hips.    ARTICULAR CARTILAGE AND LABRUM  Articular cartilage: There is no joint space narrowing or cartilage disease.  Labrum: No labral tear or paralabral cyst is identified. The ligamentum teres is normal.  The alpha angle is  within normal limits (<55-60%).    JOINT OR BURSAL EFFUSION  Joint effusion: There is no significant joint effusion.  Bursal effusion: No iliopsoas or trochanteric bursal effusion is present.    MUSCLES AND TENDONS  The rectus femoris, iliopsoas, proximal hamstrings, quadratus femoris, and hip abductors are intact.    OTHER FINDINGS  Miscellaneous: Degenerative disc disease with disc desiccation and disc height loss. Moderate facet degenerative change lumbar spine. There are diverticuli in the colon. There is a urinary bladder diverticulum on the right measuring 1.8 cm.  Motion limits evaluation. However this is a diagnostic exam.    Impression:      No fractures of the hip.  Moderate arthritis of the hips.  Degenerative disc disease and facet degenerative change lumbar spine.      Electronically  Signed: Oralia Tanner MD    7/9/2024 7:34 AM EDT    Workstation ID: [id number]    XR Chest 1 View [[id number]] Collected: 07/08/24 2219     Updated: 07/08/24 2222    Narrative:      XR CHEST 1 VW    Date of Exam: 7/8/2024 6:58 PM EDT    Indication: leukocytosis, confusion    Comparison: None available.    Findings:  Lines: None    Lungs: The lungs are clear.  Pleura: Pleural thickening at the left lung apex. Questionable trace left apical pneumothorax.    Cardiomediastinum: The cardiomediastinal silhouette is normal    Soft Tissues: Unremarkable.    Bones: No acute osseous abnormality.      Impression:      Impression:  There is pleural thickening with questionable trace left apical pneumothorax, most likely artifactual. If further evaluation is warranted, please consider dedicated CT of the chest.    Otherwise no definite acute cardiopulmonary abnormality      Electronically Signed: Walker Farfan DO    7/8/2024 10:20 PM EDT    Workstation ID: [id number]    CT Lower Extremity Right Without Contrast [[id number]] Collected: 07/08/24 1646     Updated: 07/08/24 1653    Narrative:      CT LOWER EXTREMITY RIGHT WO CONTRAST    Date of Exam: 7/8/2024 4:42 PM EDT    Indication: right hip pain/ falls neg xray.    Comparison: None available.    Technique: Axial CT images were obtained of the right lower extremity without contrast administration.  Sagittal and coronal reconstructions were performed.  Automated exposure control and iterative reconstruction methods were used.      Findings:  Osteopenia limits evaluation for nondisplaced fractures. If concern for radiographically occult fracture MRI study of choice. There is questionable nondisplaced fracture at the femoral neck best seen on the axial image #158. Avascular channel cannot be   excluded. MRI of the hip is recommended if patient has difficulty weightbearing or pain.    There is moderate degenerative change of the hip joint. There are no aggressive osseous lesions.    No  significant joint effusion is seen on CT. There are vascular calcifications. There are diverticuli in the colon.      Impression:      Impression:  CT of the hip is nondiagnostic for hip fracture. There is a questionable nondisplaced fracture of the femoral neck could be seen with nondisplaced fracture or vascular groove. If patient has difficulty weightbearing or pain, MRI of the hip is recommended   for better evaluation.  Osteopenia limits evaluation.  Moderate arthritis of the hip.        Electronically Signed: Oralia Tanner MD    7/8/2024 4:50 PM EDT    Workstation ID: [id number]    XR Ankle 3+ View Right [[id number]] Collected: 07/08/24 1526     Updated: 07/08/24 1530    Narrative:      XR ANKLE 3+ VW RIGHT    Date of Exam: 7/8/2024 3:18 PM EDT    Indication: pain after falling    Comparison: None available.    Findings:  Minimally displaced fracture through the distal fibula/lateral malleolus. The remainder of the osseous structures are intact.    The ankle mortise is intact.  Mild degenerative changes of the ankle.    Soft tissue edema surrounding the ankle.      Impression:      Impression:  Minimally displaced fracture through the distal fibula/lateral malleolus.      Electronically Signed: Walker Farfan DO    7/8/2024 3:28 PM EDT    Workstation ID: [id number]                 I reviewed the patient's new clinical results.    Medication Review:   Scheduled Meds:famotidine, 40 mg, Oral, Daily  sodium chloride, 10 mL, Intravenous, Q12H      Continuous Infusions:   PRN Meds:.  acetaminophen    senna-docusate sodium **AND** polyethylene glycol **AND** bisacodyl **AND** bisacodyl    Calcium Replacement - Follow Nurse / BPA Driven Protocol    HYDROcodone-acetaminophen    Magnesium Standard Dose Replacement - Follow Nurse / BPA Driven Protocol    ondansetron    Phosphorus Replacement - Follow Nurse / BPA Driven Protocol    Potassium Replacement - Follow Nurse / BPA Driven Protocol    [COMPLETED] Insert Peripheral IV  **AND** sodium chloride    sodium chloride    [COMPLETED] Insert Peripheral IV **AND** sodium chloride    sodium chloride    sodium chloride     Interval History:    Assessment & Plan     Right hip pain  - difficulty bearing weight  -MRI negative for acute findings     Right fibula fracture  -per ortho very minimally displaced and could be amenable to nonoperative management which would be placement of a cast and nonweightbearing for a total of 6 weeks. I will see him in clinic and place this cast. 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 If no old outside studies are available for comparison, short interval follow-up CT scan or brain MRI could be considered      Leukocytosis/afebrile with no compalints - UA neg, respiratory panel neg, chest xray ne, blood cultures     Chronic insomnia - holding Ambien given altered mental status     DM-II - consistent carb diet; check A1C 6.55     CKD stage 3 - creatinine is at baseline; avoid nephrotoxins       Plan for disposition:Inpt rehab    Melva JonesLANI  07/09/24  11:24 EDT          Electronically signed by Sonia Valadez MD at 07/09/24 1700          Consult Notes (last 48 hours)        Marianna Chiu MD at 07/09/24 0707        Consult Orders    1. Inpatient Orthopedic Surgery Consult [[id number]] ordered by Sonia Valadez MD at 07/08/24 1828                   Orthopaedic Consult    Marianna Chiu MD      Patient: Yuan Salas    Date of Admission: 7/8/2024  3:03 PM    YOB: 1945    Medical Record Number: [id number]    Attending Physician: Sonia Valadez MD  Consulting Physician: Marianna Chiu MD      Chief Complaints: Right hip pain  [M25.551]  Multiple falls [R29.6]  Closed nondisplaced fracture of lateral malleolus of right fibula, initial encounter [S82.64XA]  Acute hip pain, right [M25.551]      History of Present Illness: 79 y.o. male admitted to [facility] with Right hip pain [M25.551]  Multiple falls [R29.6]  Closed nondisplaced fracture of lateral malleolus of right fibula, initial encounter [S82.64XA]  Acute hip pain, right [M25.551]. I was consulted for further evaluation and treatment. Onset of symptoms was yesterday.  He lives alone.  He reports he fell going to the bathroom and fell between the couch.  He landed on his right ankle and his right hip.  He believes most of the pain is sciatic and related of his hip.  He is complaining of pain in his ankle mostly on the lateral side.  He reports he was a diabetic but is diet controlled.  He denies use of tobacco products.  Overall he reports he is relatively healthy does not use an assistive device at home to walk.     Allergies: No Known Allergies    Medications:   Home Medications:  Medications Prior to Admission   Medication Sig Dispense Refill Last Dose    allopurinol (ZYLOPRIM) 100 MG tablet Take 1 tablet by mouth Daily.   7/7/2024    amLODIPine (NORVASC) 5 MG tablet Take 1 tablet by mouth Daily.   7/7/2024    atorvastatin (LIPITOR) 40 MG tablet Take 1 tablet by mouth Daily.   7/7/2024    dapagliflozin Propanediol (Farxiga) 10 MG tablet Take 10 mg by mouth Daily.   7/8/2024    FLUoxetine (PROzac) 40 MG capsule Take 1 capsule by mouth Daily.   7/8/2024    levothyroxine (SYNTHROID, LEVOTHROID) 50 MCG tablet Take 1 tablet by mouth Daily.   7/8/2024    pioglitazone (ACTOS) 30 MG tablet Take 1 tablet by mouth Daily.   7/8/2024    zolpidem (Ambien) 10 MG tablet Take 1 tablet by mouth At Night As Needed for Sleep.   7/7/2024    methocarbamol (ROBAXIN) 750 MG tablet Take 1 tablet by mouth 3 (Three) Times a Day As Needed for Muscle Spasms.          Current Medications:  Scheduled  Meds:famotidine, 40 mg, Oral, Daily  sodium chloride, 10 mL, Intravenous, Q12H      Continuous Infusions:   PRN Meds:.  acetaminophen    senna-docusate sodium **AND** polyethylene glycol **AND** bisacodyl **AND** bisacodyl    Calcium Replacement - Follow Nurse / BPA Driven Protocol    HYDROcodone-acetaminophen    Magnesium Standard Dose Replacement - Follow Nurse / BPA Driven Protocol    ondansetron    Phosphorus Replacement - Follow Nurse / BPA Driven Protocol    Potassium Replacement - Follow Nurse / BPA Driven Protocol    [COMPLETED] Insert Peripheral IV **AND** sodium chloride    sodium chloride    [COMPLETED] Insert Peripheral IV **AND** sodium chloride    sodium chloride    sodium chloride    Past Medical History:   Diagnosis Date    Asthma     Chronic headaches     Diabetes mellitus     Disease of thyroid gland     Gout     Hyperlipidemia     Hypertension     Sleep apnea     does not use cpap or bipap     Past Surgical History:   Procedure Laterality Date    CATARACT EXTRACTION      RETINAL DETACHMENT SURGERY Bilateral      Social History     Occupational History    Not on file   Tobacco Use    Smoking status: Never    Smokeless tobacco: Never   Vaping Use    Vaping status: Never Used   Substance and Sexual Activity    Alcohol use: Never    Drug use: Never    Sexual activity: Not on file      Social History     Social History Narrative    Not on file     History reviewed. No pertinent family history.      Review of Systems:   Constitutional: Negative for fatigue, fever, or weight loss  HEENT: Patient denies any headaches, vision changes, sore throat. Admits to wearing glasses  Pulmonary: Patient denies any cough, congestion, SOA, or wheezing  Cardiovascular: Patient denies any chest pain, palpitations, weakness,  Gastrointestinal:  Patient denies nausea, vomiting, diarrhea, constipation  Genital/Urinary: Patient denies dysuria, urinary frequency, urgency, incontinence, retention  Musculoskeletal: Positive for  "right hip pain mostly sciatic in nature and ankle pain. Negative for muscle cramps  Neurological: Patient denies dizziness, paresthesia, loss of sensation, seizures, syncope  Endocrine system: Patient denies tremors, cold or heat intolerance  Psychological: Patient denies thoughts/plans or harming self or other; hallucinations,  insomnia  Skin: Patient denies any bruising, rashes, lesions, or ulcers   Hematopoietic: Patient denies history of MRSA or slow wound healing,  spontaneous or excessive bleeding    Physical Exam: 79 y.o. male  Vitals:    07/08/24 1802 07/08/24 2102 07/09/24 0035 07/09/24 0428   BP: 129/61 122/70 127/71 121/74   BP Location:  Right arm Right arm Right arm   Patient Position:  Lying Lying Lying   Pulse: 79 89 94 85   Resp: 18 18 18 16   Temp:  97.6 °F (36.4 °C) 97.6 °F (36.4 °C) 97.5 °F (36.4 °C)   TempSrc:  Oral Oral Oral   SpO2: 91% 93% 98% 91%   Weight:  82.4 kg (181 lb 10.5 oz)     Height:  172.7 cm (68\")                              General Appearance:  Alert, cooperative, in no acute distress    HEENT:    Normocephalic,  Atraumatic, Pupils are equal   Neck:   No adenopathy, supple, trachea midline, no thyromegaly       Lungs:     Clear to auscultation, equal expansion bilaterally, respirations regular, even and               unlabored    Heart:    Regular rhythm and normal rate, normal S1 and S2, no murmur, no gallops   Chest Wall:    Within normal limits   Abdomen:     Normal bowel sounds, no masses,  soft non-tender, non-distended,       Rectal:  Musculoskeletal:     Deferred  He is nontender with a logroll of the right lower extremity.  He is able to flex and extend through the hip.  He is nontender over the greater trochanter.  He reports most of the pain is located in the back of his hip radiating down his leg.  He is tender palpation over his lateral ankle there is appropriate ecchymoses.  He is able to flex and extend all of his toes neuro intact   Extremities:   No clubbing, no " edema, no cyanosis   Pulses  Neurovascular:   Pulses palpable and equal bilaterally in all 4 extremities    Patient was alert and oriented x 3 spheres   Skin:   No lesions, ulcers or rashes   Neurologic:   Cranial nerves 2 - 12 grossly intact, sensation intact            Diagnostic Tests:    I reviewed his CT and MRI of his right femur I do not personally appreciate any fractures.  I will wait to the final read of the radiologist is made.  I do believe this is okay for no management from orthopedic standpoint.  He is symptomatic from sciatica     3 views of his right ankle demonstrate that there is a mildly displaced lateral malleolus fracture.            Assessment:    Right hip pain    Right sciatic pain possible hip fracture  Right lateral malleolus fracture        Plan:      I will get x-rays of his ankle in the boot and follow-up on this.  This is very minimally displaced and could be amenable to nonoperative management which would be placement of a cast and nonweightbearing for a total of 6 weeks.  I will see him in clinic and place this cast.  Discussed with the patient if he does have movement of his lateral malleolus in the boot we would have a operative discussion.  Questions encouraged and answered no guarantees given.    Date: 7/9/2024  Marianna Chiu MD  Orthopaedic Surgeon    [facility]  [phone]  [email or web address]                    Electronically signed by Marianna Chiu MD at 07/09/24 0762       Nutrition Notes (most recent note)    No notes exist for this encounter.          Physical Therapy Notes (all)        Maria Del Carmen Melgar PT at 07/09/24 0800  Version 1 of 1         Goal Outcome Evaluation:  Plan of Care Reviewed With: patient   Pt presents as a 78 y/o F admitted to [facility] on 7/8/24 with acute right leg pain. Imaging showed Right lateral malleolus fracture. He reports he fell going to the bathroom and fell between the couch. He landed on his right  ankle and his right hip. Per ortho: nonoperative management which would be placement of a cast and nonweightbearing for a total of 6 weeks. MRI right hip was negative for fx. Pt A and O x 4. At baseline, pt lives alone in a ground floor apt with 0 YASMINE and he uses a rolling walker for household mobility; pt drives. Pt has a CAM boot in place RLE. Pt requiring mod assist of 2 for a stand-pivot-sit transfer with rolling walker from bed to recliner NWB RLE. PT kept her foot beneath pt's R CAM boot to monitor his NWB ability. Pt was unable to offload weight on to BUE to take hopping steps. Pt does not have coordination needed to use knee scooter. Pt will need a wheelchair for the next 6 weeks to maintain NWB RLE. Pt has no family or friends to assist him at home. PT recommendation is In-patient rehab Facility.                Anticipated Discharge Disposition (PT): inpatient rehabilitation facility                          Electronically signed by Maria Del Carmen Melgar, PT at 24 1513       Maria Del Carmen Melgar, PT at 24 0800  Version 1 of 1         Patient Name: Yuan Salas  : 1945    MRN: [id number]                              Today's Date: 2024       Admit Date: 2024    Visit Dx:     ICD-10-CM ICD-9-CM   1. Acute hip pain, right  M25.551 719.45   2. Multiple falls  R29.6 V15.88   3. Closed nondisplaced fracture of lateral malleolus of right fibula, initial encounter  S82.64XA 824.2     Patient Active Problem List   Diagnosis    Right hip pain     Past Medical History:   Diagnosis Date    Asthma     Chronic headaches     Diabetes mellitus     Disease of thyroid gland     Gout     Hyperlipidemia     Hypertension     Sleep apnea     does not use cpap or bipap     Past Surgical History:   Procedure Laterality Date    CATARACT EXTRACTION      RETINAL DETACHMENT SURGERY Bilateral       General Information       Row Name 24 1453          Physical Therapy Time and Intention    Document Type  evaluation  -BR     Mode of Treatment physical therapy  -BR       Row Name 07/09/24 1457 07/09/24 1453       General Information    Patient Profile Reviewed -- yes  -BR    Prior Level of Function --  Pt uses rolling walker or cane at baseline.  -BR independent:;all household mobility;community mobility;gait;transfer;driving  -BR    Existing Precautions/Restrictions -- non-weight bearing;fall;other (see comments)  NWB RLE  -BR    Barriers to Rehab -- --  Pt has no family or friends to support him at home.  -BR      Row Name 07/09/24 1453          Living Environment    People in Home alone  -BR       Row Name 07/09/24 1453          Home Main Entrance    Number of Stairs, Main Entrance none  -BR       Row Name 07/09/24 1453          Stairs Within Home, Primary    Number of Stairs, Within Home, Primary none  -BR       Row Name 07/09/24 1453          Cognition    Orientation Status (Cognition) oriented x 4  -BR       Row Name 07/09/24 1453          Safety Issues, Functional Mobility    Safety Issues Affecting Function (Mobility) insight into deficits/self-awareness;problem-solving;unable to maintain weight-bearing restrictions  -BR     Impairments Affecting Function (Mobility) balance;pain;endurance/activity tolerance;strength  -BR               User Key  (r) = Recorded By, (t) = Taken By, (c) = Cosigned By      Initials Name Provider Type    BR Maria Del Carmen Melgar, JOSHUA Physical Therapist                   Mobility       Row Name 07/09/24 1456          Bed Mobility    Bed Mobility supine-sit  -BR     Supine-Sit [place] (Bed Mobility) verbal cues;supervision  -BR       Row Name 07/09/24 1457          Bed-Chair Transfer    Bed-Chair [place] (Transfers) moderate assist (50% patient effort);2 person assist;verbal cues  -BR     Assistive Device (Bed-Chair Transfers) walker, front-wheeled  -BR     Comment, (Bed-Chair Transfer) This PT placed her foot beneath pt's left foot that was immobilized with CAM boot to ensure  NWB. Pt unable to offload RLE using BUE. Pt pivoted small steps on RLE.  -BR       Row Name 07/09/24 1457          Sit-Stand Transfer    Sit-Stand [place] (Transfers) verbal cues;2 person assist;minimum assist (75% patient effort)  -BR     Assistive Device (Sit-Stand Transfers) walker, front-wheeled  -BR     Comment, (Sit-Stand Transfer) Pt required 100% cueing for NWB RLE.  -BR       Row Name 07/09/24 1457          Gait/Stairs (Locomotion)    [place] Level (Gait) moderate assist (50% patient effort);2 person assist  -BR     Assistive Device (Gait) walker, front-wheeled  -BR     Patient was able to Ambulate yes  -BR     Distance in Feet (Gait) 1  -BR     Deviations/Abnormal Patterns (Gait) weight shifting decreased  -BR     Bilateral Gait Deviations forward flexed posture  -BR     Comment, (Gait/Stairs) 3 small scooting steps on RLE to pivot over to recliner  -BR       Row Name 07/09/24 1456          Mobility    Extremity Weight-bearing Status right lower extremity  -BR     Right Lower Extremity (Weight-bearing Status) non weight-bearing (NWB)  -BR               User Key  (r) = Recorded By, (t) = Taken By, (c) = Cosigned By      Initials Name Provider Type    BR Maria Del Carmen Melgar PT Physical Therapist                   Obj/Interventions       Row Name 07/09/24 1500          Range of Motion Comprehensive    General Range of Motion bilateral lower extremity ROM WFL  -BR     Comment, General Range of Motion R foot immobilized in CAM boot.  -BR       Row Name 07/09/24 1500          Strength Comprehensive (MMT)    Comment, General Manual Muscle Testing (MMT) Assessment Grossly 3+/5 LLE and Right hip/knee  -BR       Row Name 07/09/24 1500          Balance    Balance Assessment sitting static balance;sitting dynamic balance;standing static balance  -BR     Static Sitting Balance supervision  -BR     Dynamic Sitting Balance supervision  -BR     Position, Sitting Balance unsupported;sitting edge of bed  -BR      Dynamic Standing Balance minimal assist;2-person assist;verbal cues  -BR     Position/Device Used, Standing Balance supported;walker, rolling  -BR       Row Name 07/09/24 1500          Sensory Assessment (Somatosensory)    Sensory Assessment (Somatosensory) sensation intact  -BR               User Key  (r) = Recorded By, (t) = Taken By, (c) = Cosigned By      Initials Name Provider Type    Maria Del Carmen Watson PT Physical Therapist                   Goals/Plan       Row Name 07/09/24 1514          Bed Mobility Goal 1 (PT)    Activity/Assistive Device (Bed Mobility Goal 1, PT) bed mobility activities, all  -BR     [place] Level/Cues Needed (Bed Mobility Goal 1, PT) independent  -BR     Time Frame (Bed Mobility Goal 1, PT) long term goal (LTG);2 weeks  -BR       Row Name 07/09/24 1514          Transfer Goal 1 (PT)    Activity/Assistive Device (Transfer Goal 1, PT) transfers, all  -BR     [place] Level/Cues Needed (Transfer Goal 1, PT) modified independence  -BR     Time Frame (Transfer Goal 1, PT) long term goal (LTG);2 weeks  -BR       Row Name 07/09/24 1514          Gait Training Goal 1 (PT)    Activity/Assistive Device (Gait Training Goal 1, PT) gait (walking locomotion);assistive device use  -BR     [place] Level (Gait Training Goal 1, PT) modified independence  -BR     Distance (Gait Training Goal 1, PT) 10  -BR     Time Frame (Gait Training Goal 1, PT) long term goal (LTG);2 weeks  -BR       Row Name 07/09/24 1514          Therapy Assessment/Plan (PT)    Planned Therapy Interventions (PT) balance training;bed mobility training;gait training;patient/family education;strengthening;ROM (range of motion);transfer training  -BR               User Key  (r) = Recorded By, (t) = Taken By, (c) = Cosigned By      Initials Name Provider Type    Maria Del Carmen Watson PT Physical Therapist                   Clinical Impression       Row Name 07/09/24 1501          Pain    Pretreatment Pain Rating 0/10 - no  pain  -BR     Posttreatment Pain Rating 1/10  -BR     Pain Location - Side/Orientation Right  -BR     Pain Location - foot  -BR       Row Name 07/09/24 1501          Plan of Care Review    Plan of Care Reviewed With patient  -BR     Outcome Evaluation Pt presents as a 78 y/o F admitted to [facility] on 7/8/24 with acute right leg pain. Imaging showed Right lateral malleolus fracture. He reports he fell going to the bathroom and fell between the couch. He landed on his right ankle and his right hip. Per ortho: nonoperative management which would be placement of a cast and nonweightbearing for a total of 6 weeks. MRI right hip was negative for fx. Pt A and O x 4. At baseline, pt lives alone in a ground floor apt with 0 YASMINE and he uses a rolling walker for household mobility; pt drives. Pt has a CAM boot in place RLE. Pt requiring mod assist of 2 for a stand-pivot-sit transfer with rolling walker from bed to recliner NWB RLE. PT kept her foot beneath pt's R CAM boot to monitor his NWB ability. Pt was unable to offload weight on to BUE to take hopping steps. Pt does not have coordination needed to use knee scooter. Pt will need a wheelchair for the next 6 weeks to maintain NWB RLE. Pt has no family or friends to assist him at home. PT recommendation is In-patient rehab Facility.  -BR       Row Name 07/09/24 1501          Therapy Assessment/Plan (PT)    Rehab Potential (PT) good, to achieve stated therapy goals  -BR     Criteria for Skilled Interventions Met (PT) yes;meets criteria;skilled treatment is necessary  -BR     Therapy Frequency (PT) daily  -BR     Predicted Duration of Therapy Intervention (PT) until D/C  -BR       Row Name 07/09/24 1501          Vital Signs    O2 Delivery Pre Treatment room air  -BR     Pre Patient Position Supine  -BR     Intra Patient Position Standing  -BR     Post Patient Position Sitting  -BR       Row Name 07/09/24 1501          Positioning and Restraints    Pre-Treatment Position in bed  -BR      Post Treatment Position chair  -BR     In Chair notified nsg;reclined;call light within reach;encouraged to call for assist;exit alarm on;legs elevated  -BR               User Key  (r) = Recorded By, (t) = Taken By, (c) = Cosigned By      Initials Name Provider Type    Maria Del Carmen Watson, JOSHUA Physical Therapist                   Outcome Measures       Row Name 07/09/24 1515 07/09/24 0800       How much help from another person do you currently need...    Turning from your back to your side while in flat bed without using bedrails? 3  -BR 4  -SM    Moving from lying on back to sitting on the side of a flat bed without bedrails? 3  -BR 3  -SM    Moving to and from a bed to a chair (including a wheelchair)? 2  -BR 3  -SM    Standing up from a chair using your arms (e.g., wheelchair, bedside chair)? 2  -BR 3  -SM    Climbing 3-5 steps with a railing? 1  -BR 3  -SM    To walk in hospital room? 2  -BR 3  -SM    AM-PAC 6 Clicks Score (PT) 13  -BR 19  -SM    Highest Level of Mobility Goal 4 --> Transfer to chair/commode  -BR 6 --> Walk 10 steps or more  -SM      Row Name 07/09/24 1515          Functional Assessment    Outcome Measure Options AM-PAC 6 Clicks Basic Mobility (PT)  -BR               User Key  (r) = Recorded By, (t) = Taken By, (c) = Cosigned By      Initials Name Provider Type    Ena Morales, RN Registered Nurse    Maria Del Carmen Watson, PT Physical Therapist                                 Physical Therapy Education       Title: PT OT SLP Therapies (Done)       Topic: Physical Therapy (Done)       Point: Mobility training (Done)       Learning Progress Summary             Patient Acceptance, E,D, VU,DU,NR by DANIELLE at 7/9/2024 1515                         Point: Body mechanics (Done)       Learning Progress Summary             Patient Acceptance, E,D, VU,DU,NR by BR at 7/9/2024 1515                         Point: Precautions (Done)       Learning Progress Summary             Patient Acceptance, E,D,  LEONEL,KENDELL,NR by DANIELLE at 7/9/2024 1515                                         User Key       Initials Effective Dates Name Provider Type Discipline    DANIELLE 02/01/22 -  Maria Del Carmen Melgar PT Physical Therapist PT                  PT Recommendation and Plan  Planned Therapy Interventions (PT): balance training, bed mobility training, gait training, patient/family education, strengthening, ROM (range of motion), transfer training  Plan of Care Reviewed With: patient  Outcome Evaluation: Pt presents as a 78 y/o F admitted to [facility] on 7/8/24 with acute right leg pain. Imaging showed Right lateral malleolus fracture. He reports he fell going to the bathroom and fell between the couch. He landed on his right ankle and his right hip. Per ortho: nonoperative management which would be placement of a cast and nonweightbearing for a total of 6 weeks. MRI right hip was negative for fx. Pt A and O x 4. At baseline, pt lives alone in a ground floor apt with 0 YASMINE and he uses a rolling walker for household mobility; pt drives. Pt has a CAM boot in place RLE. Pt requiring mod assist of 2 for a stand-pivot-sit transfer with rolling walker from bed to recliner NWB RLE. PT kept her foot beneath pt's R CAM boot to monitor his NWB ability. Pt was unable to offload weight on to BUE to take hopping steps. Pt does not have coordination needed to use knee scooter. Pt will need a wheelchair for the next 6 weeks to maintain NWB RLE. Pt has no family or friends to assist him at home. PT recommendation is In-patient rehab Facility.     Time Calculation:              PT G-Codes  Outcome Measure Options: AM-PAC 6 Clicks Basic Mobility (PT)  AM-PAC 6 Clicks Score (PT): 13  PT Discharge Summary  Anticipated Discharge Disposition (PT): inpatient rehabilitation facility    Maria Del Carmen Melgar PT  7/9/2024      Electronically signed by Maria Del Carmen Melgar PT at 07/09/24 6573       Christopher Griffin, MARLYN at 07/10/24 1221  Version 1 of 1         Subjective:  Pt agreeable to therapeutic plan of care. Upon arrival, pt found sitting at EOB attempting to don pants with R CAM boot donned, notably putting weight through RLE.     Objective:     Bed mobility - N/A or Not attempted.    Transfers - Multiple STS performed using RW.Pt requires Mod Ax2. Pt requires max cueing and education on appropriate technique, safety, and weight bearing restriction, with PTA manually keeping pt's R foot off floor to prevent pt from bearing weight through it. Limited compliance.     Ambulation - 3 feet mod-max Ax2 using RW. PTA kept pt's R foot off floor while OT provided upright support and managed RW. Pt with significant difficulty taking steps and turning while complying with weight bearing restriction, making multiple attempts to place foot on floor. Pt lost balance, requiring PTA and OT to assist pt into chair with max Ax2.     Vitals: WNL    Pain: Pt did not rate intensity of R ankle pain.     Education: Provided education on the importance of mobility in the acute care setting, Verbal/Tactile Cues, ADL training, Transfer Training, Gait Training, and WB'ing status    Assessment: Yuan Salas presents with functional mobility impairments which indicate the need for skilled intervention. Tolerating session today without incident. Pt A&Ox4; however, demonstrates significant difficulty recalling and complying with weight bearing status, despite extensive and repetitive education on NWB status through RLE. Pt makes multiple attempts to place foot on floor and place weight through it throughout session, requiring PTA to manually keep foot off floor during functional transfers and gait training. Pt is not safe to return home and is at a high falls risk and risk for further injury to R ankle, as he is noncompliant with weight bearing restrictions. Pt would benefit from Acute IP Rehab for further education/reinforcement and gain functional independence. Will continue to follow and progress as  tolerated.     Plan/Recommendations:   If medically appropriate, High Intensity Therapy recommended post-acute care. This is recommended as therapy feels the patient would require 5-6 days per week, 2-3 hours per day. At this time, inpatient rehabilitation (acute rehab) would be the first choice and SNF would be second. Pt requires no DME at discharge.     Pt desires Skilled Rehab placement at discharge. Pt cooperative; agreeable to therapeutic recommendations and plan of care.         Basic Mobility 6-click:  Rollin = Total, A lot = 2, A little = 3; 4 = None  Supine>Sit:   1 = Total, A lot = 2, A little = 3; 4 = None   Sit>Stand with arms:  1 = Total, A lot = 2, A little = 3; 4 = None  Bed>Chair:   1 = Total, A lot = 2, A little = 3; 4 = None  Ambulate in room:  1 = Total, A lot = 2, A little = 3; 4 = None  3-5 Steps with railin = Total, A lot = 2, A little = 3; 4 = None  Score: 10    Modified [place]: N/A = No pre-op stroke/TIA    Post-Tx Position: Up in Chair, Alarms activated, and Call light and personal items within reach  PPE: gloves      Electronically signed by Christopher Griffin PTA at 07/10/24 1238       Christopher Griffin PTA at 07/10/24 1238  Version 1 of 1         Assessment: Yuan Salas presents with functional mobility impairments which indicate the need for skilled intervention. Tolerating session today without incident. Pt A&Ox4; however, demonstrates significant difficulty recalling and complying with weight bearing status, despite extensive and repetitive education on NWB status through RLE. Pt makes multiple attempts to place foot on floor and place weight through it throughout session, requiring PTA to manually keep foot off floor during functional transfers and gait training. Pt is not safe to return home and is at a high falls risk and risk for further injury to R ankle, as he is noncompliant with weight bearing restrictions. Pt would benefit from Acute IP  Rehab for further education/reinforcement and gain functional independence. Will continue to follow and progress as tolerated.     Plan/Recommendations:   If medically appropriate, High Intensity Therapy recommended post-acute care. This is recommended as therapy feels the patient would require 5-6 days per week, 2-3 hours per day. At this time, inpatient rehabilitation (acute rehab) would be the first choice and SNF would be second. Pt requires no DME at discharge.     Pt desires Skilled Rehab placement at discharge. Pt cooperative; agreeable to therapeutic recommendations and plan of care.       Electronically signed by Christopher Griffin PTA at 07/10/24 1239          Occupational Therapy Notes (last 24 hours)        Marisel Finney OT at 07/10/24 1309          Assessment: Yuan Salas presents with ADL impairments affecting function including balance, cognition, endurance / activity tolerance, pain, postural / trunk control, range of motion (ROM), and strength. Demonstrated functioning below baseline abilities indicate the need for continued skilled intervention while inpatient. Pt demo cognitive deficits, pt oriented to month/year, name and occasionally appears to understand place, however demo executive function deficits. Pt highly distractible, poor attention to task, poor problem solving, and decreased divided attention. Pt noted to be sitting edge of bed upon arrival, attempting to don pants, notably bearing weight through RLE. Pt requires continuous reminders to adhere to NWB status, however pt demo limited understanding. Pt is at high risk for further injuring RLE, is not safe to dc home at this time. Pt demo good activity tolerance and likely to tolerate high intensity with therapy, OT continues to recommend acute IP rehab when medically appropriate for dc. Tolerating session today without incident. Will continue to follow and progress as tolerated.     Plan/Recommendations:   High Intensity  Therapy recommended post-acute care. This is recommended as therapy feels the patient would require 5-6 days per week, 2-3 hours per day. At this time, inpatient rehabilitation (acute rehab) would be the first choice and SNF would be second.. Pt requires no DME at discharge.     Pt desires Inpatient Rehabilitation placement at discharge. Pt cooperative; agreeable to therapeutic recommendations and plan of care.     Electronically signed by Marisel Finney OT at 07/10/24 1309       Marisel Finney OT at 07/10/24 1157          Subjective: Pt agreeable to therapeutic plan of care. Pt pleasantly confused, unable to recall WBS, situation, place and demo difficulty participating in conversation related to dc recommendations. Pt demo decreased divided attention, highly distractibility, and difficulty attending to tasks. Pt sitting edge of bed attempting to don pants upon arrival, CAM boot donned.    Short Blessed Test: 0/28 indicating normal cognition     Cognition: oriented to Person and Time, safety/judgement: poor, and awareness of deficits: poor awareness of safety precaution and poor awareness of defits, pt disoriented to full situation, requiring additional education and explanation of acuity of RLE fx and importance of NWB, pt appears to understand he is in the hospital at times, other times appears confused of location     Objective:     Bed Mobility: N/A or Not attempted.   Functional Transfers: Mod-A, Assist x 2, and with rolling walker     Balance: supported, static, with UE support, and standing Mod-A and with rolling walker  Functional Ambulation: Mod-A, Max-A, Assist x 2, and with rolling walker    Lower Body Dressing: Max-A  ADL Position: edge of bed sitting and supported standing  ADL Comments: max A don/doff CAM boot, max A don/doff sock, max A thread pants around RLE (LLE threaded upon arrival) and to pull up pants     Vitals: WNL    Pain: 3 VAS  Location: chronic back pain, no c/o pain in RLE    Interventions for pain: Repositioned, Increased Activity, and Therapeutic Presence  Education: Provided education on the importance of mobility in the acute care setting, Verbal/Tactile Cues, ADL training, Transfer Training, and WB'ing status      Assessment: Yuan Salas presents with ADL impairments affecting function including balance, cognition, endurance / activity tolerance, pain, postural / trunk control, range of motion (ROM), and strength. Demonstrated functioning below baseline abilities indicate the need for continued skilled intervention while inpatient. Pt demo cognitive deficits, pt oriented to month/year, name and occasionally appears to understand place, however demo executive function deficits. Pt highly distractible, poor attention to task, poor problem solving, and decreased divided attention. Pt noted to be sitting edge of bed upon arrival, attempting to don pants, notably bearing weight through RLE. Pt requires continuous reminders to adhere to NWB status, however pt demo limited understanding. Pt is at high risk for further injuring RLE, is not safe to dc home at this time. Pt demo good activity tolerance and likely to tolerate high intensity with therapy, OT continues to recommend acute IP rehab when medically appropriate for dc. Tolerating session today without incident. Will continue to follow and progress as tolerated.     Plan/Recommendations:   High Intensity Therapy recommended post-acute care. This is recommended as therapy feels the patient would require 5-6 days per week, 2-3 hours per day. At this time, inpatient rehabilitation (acute rehab) would be the first choice and SNF would be second.. Pt requires no DME at discharge.     Pt desires Inpatient Rehabilitation placement at discharge. Pt cooperative; agreeable to therapeutic recommendations and plan of care.     Modified [place]: N/A = No pre-op stroke/TIA    Post-Tx Position: Up in Chair, Alarms activated, and Call light and  personal items within reach  PPE: gloves      Electronically signed by Marisel Finney, OT at 07/10/24 1309       Marisel Finney, OT at 24 1634          Patient Name: Yuan Salas  : 1945    MRN: [id number]                              Today's Date: 2024       Admit Date: 2024    Visit Dx:     ICD-10-CM ICD-9-CM   1. Acute hip pain, right  M25.551 719.45   2. Multiple falls  R29.6 V15.88   3. Closed nondisplaced fracture of lateral malleolus of right fibula, initial encounter  S82.64XA 824.2     Patient Active Problem List   Diagnosis    Right hip pain     Past Medical History:   Diagnosis Date    Asthma     Chronic headaches     Diabetes mellitus     Disease of thyroid gland     Gout     Hyperlipidemia     Hypertension     Sleep apnea     does not use cpap or bipap     Past Surgical History:   Procedure Laterality Date    CATARACT EXTRACTION      RETINAL DETACHMENT SURGERY Bilateral       General Information       Row Name 24 1046          OT Time and Intention    Document Type evaluation  -MS     Mode of Treatment occupational therapy  -MS       Row Name 24 1046          General Information    Patient Profile Reviewed yes  -MS     Prior Level of Function independent:;ADL's;all household mobility;community mobility;driving  -MS     Existing Precautions/Restrictions fall;right;non-weight bearing  RLE NWB with CAM boot  -MS     Barriers to Rehab --  limited social/physical support  -MS       Row Name 24 1046          Occupational Profile    Reason for Services/Referral (Occupational Profile) Pt is a 80 y/o M admitted to [facility] 24 with c/o R hip pain, noted to have x2 falls prior to admission. Ortho following: recommending RLE NWB CAM boot 6 weeks. XR R ankle (+) minimally displaced fx through distal fibula/lateral malleolus (24), (+) distal fibular fx unchanged (24). MRI hip (-) acute fx. PMHx significant for CKDIII and DMII. At baseline pt resides in ground level  apartment, 0 YASMINE. Pt typically (I) with ADLs, (I) with mobility with RW, also has cane if needed. Pt is an active . Pt has limited social support.  -MS     Environmental Supports and Barriers (Occupational Profile) limited support  -MS       Row Name 07/09/24 1046          Living Environment    People in Home alone  -MS       Row Name 07/09/24 1046          Home Main Entrance    Number of Stairs, Main Entrance none  -MS       Row Name 07/09/24 1046          Stairs Within Home, Primary    Number of Stairs, Within Home, Primary none  -MS       Row Name 07/09/24 1046          Cognition    Orientation Status (Cognition) oriented x 4  difficulty problem solving, poor insight to deficits  -MS       Row Name 07/09/24 1046          Safety Issues, Functional Mobility    Safety Issues Affecting Function (Mobility) insight into deficits/self-awareness;judgment;problem-solving;safety precaution awareness;unable to maintain weight-bearing restrictions  -MS     Impairments Affecting Function (Mobility) balance;pain;endurance/activity tolerance  -MS               User Key  (r) = Recorded By, (t) = Taken By, (c) = Cosigned By      Initials Name Provider Type    MS Marisel Finney OT Occupational Therapist                     Mobility/ADL's       Row Name 07/09/24 1047          Bed Mobility    Bed Mobility supine-sit  -MS     Supine-Sit [place] (Bed Mobility) verbal cues;standby assist  -MS     Bed Mobility, Safety Issues decreased use of legs for bridging/pushing  -MS     Comment, (Bed Mobility) cues to not bear weight through RLE with bed mobility  -MS       Row Name 07/09/24 1047          Transfers    Transfers sit-stand transfer;bed-chair transfer  -MS       Row Name 07/09/24 1047          Bed-Chair Transfer    Bed-Chair [place] (Transfers) moderate assist (50% patient effort);2 person assist  -MS     Assistive Device (Bed-Chair Transfers) walker, front-wheeled  -MS     Comment, (Bed-Chair Transfer) unable to  adhere to weight bearing status, PT placed foot under CAM boot to ensure NWB and provide tactile cueing, difficulty managing RW and advancing to take step  -MS       Row Name 07/09/24 1047          Sit-Stand Transfer    Sit-Stand [place] (Transfers) minimum assist (75% patient effort);2 person assist  -MS     Assistive Device (Sit-Stand Transfers) walker, front-wheeled  -MS     Comment, (Sit-Stand Transfer) difficulty adhering to weight bearing status  -MS       Row Name 07/09/24 1047          Functional Mobility    Patient was able to Ambulate yes  -MS       Row Name 07/09/24 1047          Mobility    Extremity Weight-bearing Status right lower extremity  -MS     Right Lower Extremity (Weight-bearing Status) non weight-bearing (NWB)  with CAM boot  -MS               User Key  (r) = Recorded By, (t) = Taken By, (c) = Cosigned By      Initials Name Provider Type    Marisel Veloz OT Occupational Therapist                   Obj/Interventions       Row Name 07/09/24 1050          Sensory Assessment (Somatosensory)    Sensory Assessment (Somatosensory) UE sensation intact  -MS       [facility] Name 07/09/24 1050          Vision Assessment/Intervention    Visual Impairment/Limitations WNL  -MS       Row Name 07/09/24 1050          Range of Motion Comprehensive    Comment, General Range of Motion BUE WNL  -MS       Row Name 07/09/24 1050          Strength Comprehensive (MMT)    Comment, General Manual Muscle Testing (MMT) Assessment BUE grossly 4-/5  -MS       Row Name 07/09/24 1050          Balance    Balance Assessment sitting static balance;sitting dynamic balance;standing static balance;standing dynamic balance  -MS     Static Sitting Balance supervision  -MS     Dynamic Sitting Balance standby assist  -MS     Position, Sitting Balance unsupported;sitting edge of bed  -MS     Static Standing Balance minimal assist  -MS     Dynamic Standing Balance minimal assist;2-person assist  -MS     Position/Device Used,  Standing Balance supported;walker, front-wheeled  -MS     Comment, Balance difficulty with single leg standing with RW upon standing  -MS               User Key  (r) = Recorded By, (t) = Taken By, (c) = Cosigned By      Initials Name Provider Type    Marisel Veloz, OT Occupational Therapist                   Goals/Plan       Row Name 07/09/24 1635          Bed Mobility Goal 1 (OT)    Activity/Assistive Device (Bed Mobility Goal 1, OT) bed mobility activities, all  -MS     Tift Level/Cues Needed (Bed Mobility Goal 1, OT) modified independence  -MS     Time Frame (Bed Mobility Goal 1, OT) long term goal (LTG);2 weeks  -MS     Strategies/Barriers (Bed Mobility Goal 1, OT) without verbal cues for WBS  -MS     Progress/Outcomes (Bed Mobility Goal 1, OT) new goal  -MS       Row Name 07/09/24 1635          Transfer Goal 1 (OT)    Activity/Assistive Device (Transfer Goal 1, OT) transfers, all  -MS     Tift Level/Cues Needed (Transfer Goal 1, OT) minimum assist (75% or more patient effort)  -MS     Time Frame (Transfer Goal 1, OT) long term goal (LTG);2 weeks  -MS     Strategies/Barriers (Transfers Goal 1, OT) without verbal cues for WBS  -MS     Progress/Outcome (Transfer Goal 1, OT) new goal  -MS       Row Name 07/09/24 1635          Dressing Goal 1 (OT)    Activity/Device (Dressing Goal 1, OT) lower body dressing  -MS     Tift/Cues Needed (Dressing Goal 1, OT) modified independence  -MS     Time Frame (Dressing Goal 1, OT) long term goal (LTG);2 weeks  -MS     Strategies/Barriers (Dressing Goal 1, OT) without verbal cues for WBS  -MS       Row Name 07/09/24 1635          Toileting Goal 1 (OT)    Activity/Device (Toileting Goal 1, OT) toileting skills, all  -MS     Tift Level/Cues Needed (Toileting Goal 1, OT) modified independence  -MS     Time Frame (Toileting Goal 1, OT) long term goal (LTG);2 weeks  -MS     Strategies/Barriers (Toileting Goal 1, OT) without verbal cues for WBS  -MS      Progress/Outcome (Toileting Goal 1, OT) new goal  -MS       Row Name 07/09/24 1635          Therapy Assessment/Plan (OT)    Planned Therapy Interventions (OT) activity tolerance training;adaptive equipment training;BADL retraining;functional balance retraining;IADL retraining;occupation/activity based interventions;passive ROM/stretching;patient/caregiver education/training;transfer/mobility retraining;strengthening exercise;ROM/therapeutic exercise  -MS               User Key  (r) = Recorded By, (t) = Taken By, (c) = Cosigned By      Initials Name Provider Type    Marisel Veloz, OT Occupational Therapist                   Clinical Impression       Row Name 07/09/24 1627          Pain Assessment    Pretreatment Pain Rating 0/10 - no pain  -MS     Posttreatment Pain Rating 1/10  -MS     Pain Location - Side/Orientation Right  -MS     Pain Location - foot  -MS     Pain Intervention(s) Repositioned;Emotional support;Nursing Notified  -MS       Row Name 07/09/24 1627          Plan of Care Review    Plan of Care Reviewed With patient  -MS     Progress no change  -MS     Outcome Evaluation Pt is a 80 y/o M admitted to [facility] 7/8/24 with c/o R hip pain, noted to have x2 falls prior to admission. Ortho following: recommending RLE NWB CAM boot 6 weeks. At baseline pt resides in ground level apartment, [address]. Pt typically (I) with ADLs, (I) with mobility with RW, also has cane if needed. Pt is an active . Pt has limited social support. This date pt A&Ox4 on RA sitting up in bed upon arrival, minimal pain with mobility. Pt educated on RLE NWB with CAM boot, verbalized understanding however does require significant verbal cues throughout to adhere to. Pt requires SBA for bed mobility, verbal cues to adhere to WBS, avoid pushing through RLE with bed mobility. Pt comes to standing with mod A x2 with RW and requires mod A x2 to transfer from bed to chair with RW. Pt demo difficulty adhering to WBS upon initial stand,  therefore chair was placed closer to bed to complete SPT, with PT placing foot under pt CAM boot to monitor NWB, continues to require mod A x2 with RW. Pt demo poor follow through with WBS, poor insight to deficits and poor problem solving. Pt is at significantly high risk for falls and is not safe to dc home at this time. OT recommend acute IP rehab when medically appropriate for dc, likely to progress well with transfer training, AE, and compensatory strategies to increase (I) to max potential. Pt noted to have limited support system, high risk for re-admission if dc home. OT will follow within acute setting.  -MS       Row Name 07/09/24 1627          Therapy Assessment/Plan (OT)    Rehab Potential (OT) good, to achieve stated therapy goals  -MS     Criteria for Skilled Therapeutic Interventions Met (OT) yes;meets criteria;skilled treatment is necessary  -MS     Therapy Frequency (OT) 5 times/wk  -MS     Predicted Duration of Therapy Intervention (OT) until d/c  -MS       Row Name 07/09/24 1627          Therapy Plan Review/Discharge Plan (OT)    Anticipated Discharge Disposition (OT) inpatient rehabilitation facility  -MS       Row Name 07/09/24 1627          Vital Signs    O2 Delivery Pre Treatment room air  -MS     O2 Delivery Intra Treatment room air  -MS     O2 Delivery Post Treatment room air  -MS     Pre Patient Position Supine  -MS     Intra Patient Position Standing  -MS     Post Patient Position Sitting  -MS       Row Name 07/09/24 1627          Positioning and Restraints    Pre-Treatment Position in bed  -MS     Post Treatment Position chair  -MS     In Chair notified nsg;reclined;call light within reach;encouraged to call for assist;exit alarm on;legs elevated  -MS               User Key  (r) = Recorded By, (t) = Taken By, (c) = Cosigned By      Initials Name Provider Type    Marisel Veloz, OT Occupational Therapist                   Outcome Measures       Row Name 07/09/24 1635          How much  help from another is currently needed...    Putting on and taking off regular lower body clothing? 2  -MS     Bathing (including washing, rinsing, and drying) 2  -MS     Toileting (which includes using toilet bed pan or urinal) 2  -MS     Putting on and taking off regular upper body clothing 4  -MS     Taking care of personal grooming (such as brushing teeth) 4  -MS     Eating meals 4  -MS     AM-PAC 6 Clicks Score (OT) 18  -MS       Row Name 07/09/24 1515 07/09/24 0800       How much help from another person do you currently need...    Turning from your back to your side while in flat bed without using bedrails? 3  -BR 4  -SM    Moving from lying on back to sitting on the side of a flat bed without bedrails? 3  -BR 3  -SM    Moving to and from a bed to a chair (including a wheelchair)? 2  -BR 3  -SM    Standing up from a chair using your arms (e.g., wheelchair, bedside chair)? 2  -BR 3  -SM    Climbing 3-5 steps with a railing? 1  -BR 3  -SM    To walk in hospital room? 2  -BR 3  -SM    AM-PAC 6 Clicks Score (PT) 13  -BR 19  -SM    Highest Level of Mobility Goal 4 --> Transfer to chair/commode  -BR 6 --> Walk 10 steps or more  -      Row Name 07/09/24 1635 07/09/24 1515       Functional Assessment    Outcome Measure Options AM-PAC 6 Clicks Daily Activity (OT)  -MS AM-PAC 6 Clicks Basic Mobility (PT)  -BR              User Key  (r) = Recorded By, (t) = Taken By, (c) = Cosigned By      Initials Name Provider Type    MS Finney Marisel, OT Occupational Therapist    Ena Morales, RN Registered Nurse    Maria Del Carmen Watson, PT Physical Therapist                    Occupational Therapy Education       Title: PT OT SLP Therapies (Done)       Topic: Occupational Therapy (Done)       Point: ADL training (Done)       Description:   Instruct learner(s) on proper safety adaptation and remediation techniques during self care or transfers.   Instruct in proper use of assistive devices.                  Learning Progress  Summary             Patient Acceptance, E,TB, VU by MS at 7/9/2024 1636                         Point: Precautions (Done)       Description:   Instruct learner(s) on prescribed precautions during self-care and functional transfers.                  Learning Progress Summary             Patient Acceptance, E,TB, VU by MS at 7/9/2024 1636                         Point: Body mechanics (Done)       Description:   Instruct learner(s) on proper positioning and spine alignment during self-care, functional mobility activities and/or exercises.                  Learning Progress Summary             Patient Acceptance, E,TB, VU by MS at 7/9/2024 1636                                         User Key       Initials Effective Dates Name Provider Type Discipline    MS 07/13/22 -  Marisel Finney, OT Occupational Therapist OT                  OT Recommendation and Plan  Planned Therapy Interventions (OT): activity tolerance training, adaptive equipment training, BADL retraining, functional balance retraining, IADL retraining, occupation/activity based interventions, passive ROM/stretching, patient/caregiver education/training, transfer/mobility retraining, strengthening exercise, ROM/therapeutic exercise  Therapy Frequency (OT): 5 times/wk  Plan of Care Review  Plan of Care Reviewed With: patient  Progress: no change  Outcome Evaluation: Pt is a 78 y/o M admitted to [facility] 7/8/24 with c/o R hip pain, noted to have x2 falls prior to admission. Ortho following: recommending RLE NWB CAM boot 6 weeks. At baseline pt resides in ground level apartment, [address]. Pt typically (I) with ADLs, (I) with mobility with RW, also has cane if needed. Pt is an active . Pt has limited social support. This date pt A&Ox4 on RA sitting up in bed upon arrival, minimal pain with mobility. Pt educated on RLE NWB with CAM boot, verbalized understanding however does require significant verbal cues throughout to adhere to. Pt requires SBA for bed mobility,  verbal cues to adhere to WBS, avoid pushing through RLE with bed mobility. Pt comes to standing with mod A x2 with RW and requires mod A x2 to transfer from bed to chair with RW. Pt demo difficulty adhering to WBS upon initial stand, therefore chair was placed closer to bed to complete SPT, with PT placing foot under pt CAM boot to monitor NWB, continues to require mod A x2 with RW. Pt demo poor follow through with WBS, poor insight to deficits and poor problem solving. Pt is at significantly high risk for falls and is not safe to dc home at this time. OT recommend acute IP rehab when medically appropriate for dc, likely to progress well with transfer training, AE, and compensatory strategies to increase (I) to max potential. Pt noted to have limited support system, high risk for re-admission if dc home. OT will follow within acute setting.     Time Calculation:                   Marisel Finney OT  7/9/2024    Electronically signed by Marisel Finney OT at 07/09/24 1636       Marisel Finney OT at 07/09/24 1636          Goal Outcome Evaluation:  Plan of Care Reviewed With: patient        Progress: no change  Outcome Evaluation: Pt is a 78 y/o M admitted to [facility] 7/8/24 with c/o R hip pain, noted to have x2 falls prior to admission. Ortho following: recommending RLE NWB CAM boot 6 weeks. At baseline pt resides in ground level apartment, [address]. Pt typically (I) with ADLs, (I) with mobility with RW, also has cane if needed. Pt is an active . Pt has limited social support. This date pt A&Ox4 on RA sitting up in bed upon arrival, minimal pain with mobility. Pt educated on RLE NWB with CAM boot, verbalized understanding however does require significant verbal cues throughout to adhere to. Pt requires SBA for bed mobility, verbal cues to adhere to WBS, avoid pushing through RLE with bed mobility. Pt comes to standing with mod A x2 with RW and requires mod A x2 to transfer from bed to chair with RW. Pt demo difficulty  adhering to WBS upon initial stand, therefore chair was placed closer to bed to complete SPT, with PT placing foot under pt CAM boot to monitor NWB, continues to require mod A x2 with RW. Pt demo poor follow through with WBS, poor insight to deficits and poor problem solving. Pt is at significantly high risk for falls and is not safe to dc home at this time. OT recommend acute IP rehab when medically appropriate for dc, likely to progress well with transfer training, AE, and compensatory strategies to increase (I) to max potential. Pt noted to have limited support system, high risk for re-admission if dc home. OT will follow within acute setting.      Anticipated Discharge Disposition (OT): inpatient rehabilitation facility                          Electronically signed by Marisel Finney OT at 07/09/24 5011       Speech Language Pathology Notes (all)    No notes exist for this encounter.

## 2024-07-10 NOTE — PLAN OF CARE
Goal Outcome Evaluation:  Plan of Care Reviewed With: patient        Progress: improving               Pt VSS, able to make needs known, ambulate assist x 1 with boot in place, awaiting facility placement, pain medication changed per provider per patient request, plan of care ongoing.

## 2024-07-10 NOTE — CASE MANAGEMENT/SOCIAL WORK
Continued Stay Note  KRISTINA Chaves     Patient Name: Yuan Salas  MRN: [id number]  Today's Date: 7/10/2024    Admit Date: 7/8/2024    Plan: DC PLAN: [facility], pending precert (started 7/10) PASRR approved. May need transport at DC.         Discharge Plan       Row Name 07/10/24 1454       Plan    Plan DC PLAN: [facility] accepted, pending precert (started 7/10) PASRR approved. May need transport at DC.      Plan Comments Recieved message from patients sister who states patient was confused and patient is not to go to [facility]. Wants first choice of [facility]. Refusing to go to [facility]. DCP reports sent and messages sent to liasions. [facility] accepted. Reached out to [facility] and requested Precert be started for 7/10.                    Expected Discharge Date and Time       Expected Discharge Date Expected Discharge Time    Jul 11, 2024           Edda Guillaume RN

## 2024-07-10 NOTE — PAYOR COMM NOTE
"AUTHORIZATION PENDING:   PLEASE CALL OR FAX DETERMINATION TO CONTACT BELOW. THANK YOU.        Eusebia Saenz RN MSN  /UR  [facility]  [phone] office  [phone] fax  [email or web address]    [facility]  NPI: [id number]  Tax: [phone]            Vinh Rich (79 y.o. Male)       Date of Birth   1945    Social Security Number       Address   [address] [place] [address] [address] [place] [address]    Home Phone   [phone]    MRN   [id number]       Anglican   Unknown    Marital Status   Unknown                            Admission Date   7/8/24    Admission Type   Emergency    Admitting Provider   Leno Miranda MD    Attending Provider   Leno Miranda MD    Department, Room/Bed   [facility] SURGICAL INPATIENT, 4123/1       Discharge Date       Discharge Disposition       Discharge Destination                                 Attending Provider: Leno Miranda MD    Allergies: No Known Allergies    Isolation: None   Infection: None   Code Status: CPR    Ht: 172.7 cm (68\")   Wt: 82.4 kg (181 lb 10.5 oz)    Admission Cmt: None   Principal Problem: Right hip pain [M25.551]                   Active Insurance as of 7/8/2024       Primary Coverage       Payor Plan Insurance Group Employer/Plan Group    [facility] MEDICARE REPLACEMENT AARP MED ADV HMO [id number]       Payor Plan Address Payor Plan Phone Number Payor Plan Fax Number Effective Dates    [address]   3/1/2024 - None Entered    [facility] [id number]         Subscriber Name Subscriber Birth Date Member ID       VINH RICH 1945 [id number]                     Emergency Contacts        (Rel.) Home Phone Work Phone Mobile Phone    Lacie Bautista (Friend) -- -- [phone]          07/10/24 1452  Inpatient Admission  Once     Completed     Level of Care: Med/Surg  Diagnosis: Right hip pain [412107]  Admitting Physician: LENO MIRANDA [[id number]]  Attending Physician: LENO MIRANDA " [[id number]]  Certification: I Certify That Inpatient Hospital Services Are Medically Necessary For Greater Than 2 Midnights    07/10/24 1451   07/08/24 1819  Initiate Observation Status  Once     Completed     Level of Care: Med/Surg  Diagnosis: Right hip pain [[id number]]  Admitting Physician: LENO VALADEZ [[id number]]  Attending Physician: LENO VALADEZ [[id number]]              History & Physical        Leno Valadez MD at 07/08/24 1832              Patient Care Team:  Leno Valadez MD as PCP - General (Internal Medicine)    Chief complaint Right hip and ankle pain    Subjective    Patient is a 79 y.o. male with h/o CKD stage 3, diabetes, chronic insomnia with dependence on Ambien who presents with 2 week history of right hip pain with frequent falls.  He is confused and can not provide details, other than telling me has fallen several times.  A family friend provides additional history that at least twice he has fallen and has required assistance to get up off the floor.  He was evaluated recently at urgent care for right hip pain and had normal xray.  He was given steroids and muscle relaxer without significant improvement.  Last night he fell again and injured his right ankle and is unable to bear weight.  He has declined over the last few months since the death of his dog with deteriorating memory, no exercise and eating only fast food.  He sleeps most of the day and stays awake at night.      In the ER, ct of hip suggests a possible nondisplaced femoral neck fracture.  Right ankle films show a distal fibular fracture.  WBC count is elevated.    Review of Systems   Constitutional:  Positive for activity change and fatigue. Negative for appetite change, chills, diaphoresis, fever and unexpected weight change.   HENT:  Negative for facial swelling.    Eyes:  Negative for visual disturbance.   Respiratory:  Negative for cough, shortness of breath, wheezing and stridor.    Cardiovascular:  Negative for chest pain, palpitations and leg  swelling.   Gastrointestinal:  Negative for abdominal pain, constipation, diarrhea, nausea and vomiting.   Genitourinary:  Negative for enuresis, flank pain, frequency and urgency.   Musculoskeletal:  Positive for arthralgias, gait problem and myalgias. Negative for neck pain and neck stiffness.   Skin:  Positive for wound. Negative for rash.   Neurological:  Negative for weakness.   Psychiatric/Behavioral:  Positive for confusion.           History  History reviewed. No pertinent past medical history.  History reviewed. No pertinent surgical history.  History reviewed. No pertinent family history.     (Not in a hospital admission)    Allergies:  Patient has no known allergies.    Objective    Vital Signs  Temp:  [97.8 °F (36.6 °C)] 97.8 °F (36.6 °C)  Heart Rate:  [79-96] 79  Resp:  [18] 18  BP: (129-151)/(61-94) 129/61     Physical Exam:      General Appearance:    Alert, cooperative, in no acute distress   Head:    Normocephalic, without obvious abnormality, atraumatic   Eyes:            Lids and lashes normal, conjunctivae and sclerae normal, no   icterus, no pallor, corneas clear, PERRLA   Ears:    Ears appear intact with no abnormalities noted   Throat:   No oral lesions, no thrush, oral mucosa moist   Neck:   No adenopathy, supple, trachea midline, no thyromegaly, no   carotid bruit, no JVD   Lungs:     Clear to auscultation,respirations regular, even and                  unlabored    Heart:    Irregular   Chest Wall:    No abnormalities observed   Abdomen:     Normal bowel sounds, no masses, no organomegaly, soft        non-tender, non-distended, no guarding, no rebound                tenderness   Extremities:   Right foot - marked STS, ecchymosis and tenderness of lateral malleolus ; limited ROM in right hip with IR/ER, no specific pain elicited with IR/ER   Pulses:   Pulses palpable and equal bilaterally   Skin:   Scattered superficial ecchymoses of varying ages on all 4 extremities   Lymph nodes:   No  palpable adenopathy   Neurologic:   Oriented to person, place, year; not month; appears dazed.  No focal neurologic deficits.       Results Review:     Imaging Results (Last 24 Hours)       Procedure Component Value Units Date/Time    CT Lower Extremity Right Without Contrast [[id number]] Collected: 07/08/24 1646     Updated: 07/08/24 1653    Narrative:      CT LOWER EXTREMITY RIGHT WO CONTRAST    Date of Exam: 7/8/2024 4:42 PM EDT    Indication: right hip pain/ falls neg xray.    Comparison: None available.    Technique: Axial CT images were obtained of the right lower extremity without contrast administration.  Sagittal and coronal reconstructions were performed.  Automated exposure control and iterative reconstruction methods were used.      Findings:  Osteopenia limits evaluation for nondisplaced fractures. If concern for radiographically occult fracture MRI study of choice. There is questionable nondisplaced fracture at the femoral neck best seen on the axial image #158. Avascular channel cannot be   excluded. MRI of the hip is recommended if patient has difficulty weightbearing or pain.    There is moderate degenerative change of the hip joint. There are no aggressive osseous lesions.    No significant joint effusion is seen on CT. There are vascular calcifications. There are diverticuli in the colon.      Impression:      Impression:  CT of the hip is nondiagnostic for hip fracture. There is a questionable nondisplaced fracture of the femoral neck could be seen with nondisplaced fracture or vascular groove. If patient has difficulty weightbearing or pain, MRI of the hip is recommended   for better evaluation.  Osteopenia limits evaluation.  Moderate arthritis of the hip.        Electronically Signed: Oralia Tanner MD    7/8/2024 4:50 PM EDT    Workstation ID: [id number]    XR Ankle 3+ View Right [[id number]] Collected: 07/08/24 1526     Updated: 07/08/24 1530    Narrative:      XR ANKLE 3+ VW RIGHT    Date of Exam:  7/8/2024 3:18 PM EDT    Indication: pain after falling    Comparison: None available.    Findings:  Minimally displaced fracture through the distal fibula/lateral malleolus. The remainder of the osseous structures are intact.    The ankle mortise is intact.  Mild degenerative changes of the ankle.    Soft tissue edema surrounding the ankle.      Impression:      Impression:  Minimally displaced fracture through the distal fibula/lateral malleolus.      Electronically Signed: Walker Farfan DO    7/8/2024 3:28 PM EDT    Workstation ID: [id number]             Lab Results (last 24 hours)       Procedure Component Value Units Date/Time    COVID-19,CEPHEID/SELMA,COR/ANASTASIA/PAD/MITESH/LAG/MARY IN-HOUSE,NP SWAB IN TRANSPORT MEDIA 1 HR TAT, RT-PCR - Swab, Nasopharynx [[id number]] Collected: 07/08/24 1801    Specimen: Swab from Nasopharynx Updated: 07/08/24 1804    Comprehensive Metabolic Panel [[id number]]  (Abnormal) Collected: 07/08/24 1459    Specimen: Blood Updated: 07/08/24 1751     Glucose 141 mg/dL      BUN 33 mg/dL      Creatinine 1.87 mg/dL      Sodium 141 mmol/L      Potassium 3.7 mmol/L      Chloride 104 mmol/L      CO2 21.1 mmol/L      Calcium 9.7 mg/dL      Total Protein 7.7 g/dL      Albumin 4.6 g/dL      ALT (SGPT) 26 U/L      AST (SGOT) 26 U/L      Alkaline Phosphatase 134 U/L      Total Bilirubin 0.5 mg/dL      Globulin 3.1 gm/dL      A/G Ratio 1.5 g/dL      BUN/Creatinine Ratio 17.6     Anion Gap 15.9 mmol/L      eGFR 36.3 mL/min/1.73     Narrative:      GFR Normal >60  Chronic Kidney Disease <60  Kidney Failure <15    The GFR formula is only valid for adults with stable renal function between ages 18 and 70.    CBC & Differential [[id number]]  (Abnormal) Collected: 07/08/24 1459    Specimen: Blood Updated: 07/08/24 1710    Narrative:      The following orders were created for panel order CBC & Differential.  Procedure                               Abnormality         Status                     ---------                                -----------         ------                     CBC Auto Differential[[id number]]        Abnormal            Final result                 Please view results for these tests on the individual orders.    CBC Auto Differential [[id number]]  (Abnormal) Collected: 07/08/24 1459    Specimen: Blood Updated: 07/08/24 1710     WBC 20.35 10*3/mm3      RBC 4.77 10*6/mm3      Hemoglobin 15.2 g/dL      Hematocrit 45.9 %      MCV 96.2 fL      MCH 31.9 pg      MCHC 33.1 g/dL      RDW 16.0 %      RDW-SD 57.6 fl      MPV 10.7 fL      Platelets 469 10*3/mm3      Neutrophil % 86.5 %      Lymphocyte % 4.3 %      Monocyte % 8.3 %      Eosinophil % 0.0 %      Basophil % 0.1 %      Immature Grans % 0.8 %      Neutrophils, Absolute 17.59 10*3/mm3      Lymphocytes, Absolute 0.88 10*3/mm3      Monocytes, Absolute 1.69 10*3/mm3      Eosinophils, Absolute 0.00 10*3/mm3      Basophils, Absolute 0.03 10*3/mm3      Immature Grans, Absolute 0.16 10*3/mm3      nRBC 0.0 /100 WBC     [place] Draw [[id number]] Collected: 07/08/24 1459    Specimen: Blood Updated: 07/08/24 1515    Narrative:      The following orders were created for panel order [place] Draw.  Procedure                               Abnormality         Status                     ---------                               -----------         ------                     Green Top (Gel)[[id number]]                                  Final result               Lavender Top[[id number]]                                     Final result               Gold Top - SST[[id number]]                                   Final result               Light Blue Top[[id number]]                                   Final result                 Please view results for these tests on the individual orders.    Green Top (Gel) [[id number]] Collected: 07/08/24 1459    Specimen: Blood Updated: 07/08/24 1515     Extra Tube Hold for add-ons.     Comment: Auto resulted.       Lavender Top [[id number]] Collected:  07/08/24 1459    Specimen: Blood Updated: 07/08/24 1515     Extra Tube hold for add-on     Comment: Auto resulted       Gold Top - SST [[id number]] Collected: 07/08/24 1459    Specimen: Blood Updated: 07/08/24 1515     Extra Tube Hold for add-ons.     Comment: Auto resulted.       Light Blue Top [[id number]] Collected: 07/08/24 1459    Specimen: Blood Updated: 07/08/24 1515     Extra Tube Hold for add-ons.     Comment: Auto resulted                I reviewed the patient's new clinical results.    Assessment & Plan      Right hip pain  - difficulty bearing weight; questionable abnormality on ct scan.  MRI is pending.  Keep NWB until further testing completed.    Right fibula fracture - anticipate conservative management; ice, elevate, will need walking boot once swelling reduces    Altered mental status - concerning for possible UTI or other infection, hepatic encephalopathy, substance use, NPH, SHD, etc.  Head ct pending.  UA, urine tox screen pending.  Check ammonia level.    Leukocytosis - checking UA, respiratory panel, chest xray, blood cultures    Chronic insomnia - holding Ambien given altered mental status    DM-II - consistent carb diet; check A1C    CKD stage 3 - creatinine is at baseline; avoid nephrotoxins    Scd's for dvt prophy  Famotidine for stress ulcer prophy            I discussed the patient's findings and my recommendations with patient.     Sonia Valadez MD  07/08/24  18:32 EDT        Electronically signed by Sonia Valadez MD at 07/08/24 1854          Emergency Department Notes        Norma Ledezma APRN at 07/08/24 1453       Attestation signed by Andi Sultana MD at 07/09/24 0033        SUPERVISE: For this patient encounter, I reviewed the APC's documentation, treatment plan, and medical decision making.  Andi Sultana MD 7/9/2024 00:33 EDT                         Subjective     Provider in Triage Note  Due to significant overcrowding in the emergency department patient was initially seen  and evaluated in triage.  Provider in triage recommended patient placement in the treatment area to initiate therapy and movement to an ER bed as soon as possible.    Patient presents with complaints of right hip and lower back pain.  Recently seen by [facility] a few days ago and diagnosed with sciatica. Given steroids and muscle relaxers with no improvement.  Fell twice today due to pain and weakness in right leg.  Injured right ankle.  No saddle anesthesia.  No bowel or bladder incontinence.      History of Present Illness  I reviewed the provider in triage note and attest this is the HPI    Patient was seen on July 5 at [facility] urgent care and had an x-ray which was read as no immediate fracture he was referred to Dr. Faria the neurosurgeon for further evaluation but he has not seen them he was given a Decadron 10 mg IM injection at that time.  Pain has increased and he had another fall this morning  He lives at home alone        Review of Systems   Musculoskeletal:         Right hip pain  Right ankle pain and swelling   Skin:  Positive for color change.        Right ankle         History reviewed. No pertinent past medical history.    No Known Allergies    History reviewed. No pertinent surgical history.    History reviewed. No pertinent family history.    Social History     Socioeconomic History    Marital status: Unknown           Objective   Physical Exam  Vitals reviewed.   Constitutional:       Appearance: He is well-developed.   HENT:      Head: Normocephalic and atraumatic.   Eyes:      Conjunctiva/sclera: Conjunctivae normal.      Pupils: Pupils are equal, round, and reactive to light.   Cardiovascular:      Rate and Rhythm: Normal rate and regular rhythm.      Heart sounds: Normal heart sounds.   Pulmonary:      Effort: Pulmonary effort is normal.      Breath sounds: Normal breath sounds.   Abdominal:      General: Bowel sounds are normal.      Palpations: Abdomen is soft.   Musculoskeletal:         General:  "Normal range of motion.      Cervical back: Normal, normal range of motion and neck supple.      Thoracic back: Normal.      Lumbar back: Normal.      Right hip: Tenderness present. Decreased range of motion. Decreased strength.      Left hip: Normal.        Legs:    Skin:     General: Skin is warm and dry.      Capillary Refill: Capillary refill takes 2 to 3 seconds.   Neurological:      General: No focal deficit present.      Mental Status: He is alert and oriented to person, place, and time.      GCS: GCS eye subscore is 4. GCS verbal subscore is 5. GCS motor subscore is 6.         Procedures      CAM Walker placed on the right foot the patient was distally neurovascular intact after placement    ED Course  ED Course as of 07/08/24 1731 Mon Jul 08, 2024   1544 Marked ready for CT [KW]   1700 Spoke to Dr. Valadez who agreed to admit [KW]   1715 Dr. Valadez at bedside [KW]      ED Course User Index  [KW] Norma Ledezma, APRN                                 /73   Pulse 92   Temp 97.8 °F (36.6 °C) (Oral)   Resp 18   Ht 177.8 cm (70\")   Wt 77.1 kg (170 lb)   SpO2 92%   BMI 24.39 kg/m²   Labs Reviewed   CBC WITH AUTO DIFFERENTIAL - Abnormal; Notable for the following components:       Result Value    WBC 20.35 (*)     RDW 16.0 (*)     RDW-SD 57.6 (*)     Platelets 469 (*)     Neutrophil % 86.5 (*)     Lymphocyte % 4.3 (*)     Eosinophil % 0.0 (*)     Immature Grans % 0.8 (*)     Neutrophils, Absolute 17.59 (*)     Monocytes, Absolute 1.69 (*)     Immature Grans, Absolute 0.16 (*)     All other components within normal limits   RAINBOW DRAW    Narrative:     The following orders were created for panel order [facility] Draw.  Procedure                               Abnormality         Status                     ---------                               -----------         ------                     Green Top (Gel)[[id number]]                                  Final result               Lavender Top[[id number]] "                                     Final result               Gold Top - SST[[id number]]                                   Final result               Light Blue Top[[id number]]                                   Final result                 Please view results for these tests on the individual orders.   COMPREHENSIVE METABOLIC PANEL   GREEN TOP   LAVENDER TOP   GOLD TOP - SST   LIGHT BLUE TOP   CBC AND DIFFERENTIAL    Narrative:     The following orders were created for panel order CBC & Differential.  Procedure                               Abnormality         Status                     ---------                               -----------         ------                     CBC Auto Differential[[id number]]        Abnormal            Final result                 Please view results for these tests on the individual orders.     Medications   sodium chloride 0.9 % flush 10 mL (has no administration in time range)   sodium chloride 0.9 % flush 10 mL (has no administration in time range)   Lidocaine 4 % 1 patch (1 patch Transdermal Medication Applied 7/8/24 1548)   sodium chloride 0.9 % flush 10 mL (has no administration in time range)   methocarbamol (ROBAXIN) tablet 500 mg (500 mg Oral Given 7/8/24 1549)   HYDROcodone-acetaminophen (NORCO) 7.5-325 MG per tablet 1 tablet (1 tablet Oral Given 7/8/24 1549)   ondansetron ODT (ZOFRAN-ODT) disintegrating tablet 4 mg (4 mg Oral Given 7/8/24 1549)     CT Lower Extremity Right Without Contrast    Result Date: 7/8/2024  Impression: CT of the hip is nondiagnostic for hip fracture. There is a questionable nondisplaced fracture of the femoral neck could be seen with nondisplaced fracture or vascular groove. If patient has difficulty weightbearing or pain, MRI of the hip is recommended  for better evaluation. Osteopenia limits evaluation. Moderate arthritis of the hip. Electronically Signed: Oralia Tanner MD  7/8/2024 4:50 PM EDT  Workstation ID: [id number]    XR Ankle 3+ View  Right    Result Date: 7/8/2024  Impression: Minimally displaced fracture through the distal fibula/lateral malleolus. Electronically Signed: Walker Farfan,   7/8/2024 3:28 PM EDT  Workstation ID: [id number]               Medical Decision Making  Patient is a 79-year-old gentleman who has had multiple falls with complaints of right hip pain that is worsening since he was seen at urgent care 3 days ago-he also complains of right ankle pain and swelling patient had above exam and x-rays of the hip had been done at the urgent care so I did a CT which was interpreted by the radiologist as having a possible nondisplaced fracture which is really inconclusive and states that an MRI might be better suited.  The patient also had an x-ray of the right ankle which shows a nondisplaced fracture of the distal fibula this was placed in a cam walker and the patient was distally neurovascularly intact after placement.  The results were discussed with Dr. Valadez who agreed to admit as the patient needs physical therapy consultation and the possibility of rehabilitation.  The patient was agreeable this plan of care.    Problems Addressed:  Acute hip pain, right: complicated acute illness or injury  Multiple falls: complicated acute illness or injury    Amount and/or Complexity of Data Reviewed  Independent Historian: caregiver     Details: Female friend at bedside  External Data Reviewed: radiology and notes.     Details: From his urgent care visit 3 days ago  Labs: ordered. Decision-making details documented in ED Course.  Radiology: ordered and independent interpretation performed. Decision-making details documented in ED Course.  ECG/medicine tests: ordered and independent interpretation performed. Decision-making details documented in ED Course.    Risk  OTC drugs.  Prescription drug management.  Decision regarding hospitalization.        Final diagnoses:   Acute hip pain, right   Multiple falls   Closed nondisplaced fracture  of lateral malleolus of right fibula, initial encounter       ED Disposition  ED Disposition       ED Disposition   Decision to Admit    Condition   --    Comment   Level of Care: Med/Surg [1]   Admitting Physician: LENO VALADEZ [[id number]]   Attending Physician: LENO VALADEZ [[id number]]                 No follow-up provider specified.       Medication List      No changes were made to your prescriptions during this visit.            Norma Ledezma, APRN  07/08/24 1731      Electronically signed by Andi Sultana MD at 07/09/24 0033       Operative/Procedure Notes (all)    No notes of this type exist for this encounter.          Physician Progress Notes (all)        Corie Ny LANI Cooper at 07/10/24 0949               LOS: 0 days   Patient Care Team:  Leno Valadez MD as PCP - General (Internal Medicine)    Subjective     C/o of leg pain    Review of Systems   Constitutional:  Positive for activity change, appetite change and fatigue.   HENT: Negative.     Respiratory: Negative.     Cardiovascular:  Positive for leg swelling.   Gastrointestinal: Negative.    Genitourinary: Negative.    Musculoskeletal:  Positive for gait problem.   Neurological:  Positive for weakness.   Psychiatric/Behavioral: Negative.             Objective     Vital Signs  Temp:  [97.3 °F (36.3 °C)-97.7 °F (36.5 °C)] 97.5 °F (36.4 °C)  Heart Rate:  [64-80] 64  Resp:  [15-16] 15  BP: (113-149)/(63-76) 139/63      Physical Exam  Vitals reviewed.   Constitutional:       Appearance: He is not ill-appearing.   HENT:      Head: Normocephalic and atraumatic.      Right Ear: External ear normal.      Left Ear: External ear normal.      Nose: Nose normal.      Mouth/Throat:      Mouth: Mucous membranes are moist.   Eyes:      General:         Right eye: No discharge.         Left eye: No discharge.   Cardiovascular:      Rate and Rhythm: Normal rate and regular rhythm.      Pulses: Normal pulses.      Heart sounds: Normal heart sounds.   Pulmonary:       Effort: Pulmonary effort is normal.      Breath sounds: Normal breath sounds.   Abdominal:      General: Bowel sounds are normal.      Palpations: Abdomen is soft.   Musculoskeletal:         General: Normal range of motion.      Cervical back: Normal range of motion.   Skin:     General: Skin is warm and dry.   Neurological:      Mental Status: He is alert and oriented to person, place, and time.   Psychiatric:         Behavior: Behavior normal.              Results Review:    Lab Results (last 24 hours)       Procedure Component Value Units Date/Time    Blood Culture - Blood, Arm, Left [[id number]]  (Normal) Collected: 07/08/24 1921    Specimen: Blood from Arm, Left Updated: 07/09/24 1931     Blood Culture No growth at 24 hours             Imaging Results (Last 24 Hours)       ** No results found for the last 24 hours. **                 I reviewed the patient's new clinical results.    Medication Review:   Scheduled Meds:famotidine, 40 mg, Oral, Daily  sodium chloride, 10 mL, Intravenous, Q12H      Continuous Infusions:   PRN Meds:.  acetaminophen    senna-docusate sodium **AND** polyethylene glycol **AND** bisacodyl **AND** bisacodyl    Calcium Replacement - Follow Nurse / BPA Driven Protocol    HYDROcodone-acetaminophen    Magnesium Standard Dose Replacement - Follow Nurse / BPA Driven Protocol    ondansetron    Phosphorus Replacement - Follow Nurse / BPA Driven Protocol    Potassium Replacement - Follow Nurse / BPA Driven Protocol    [COMPLETED] Insert Peripheral IV **AND** sodium chloride    sodium chloride    [COMPLETED] Insert Peripheral IV **AND** sodium chloride    sodium chloride    sodium chloride     Interval History:    Assessment & Plan     Right hip pain  - difficulty bearing weight  -MRI negative for acute findings     Right fibula fracture  -per ortho very minimally displaced and could be amenable to nonoperative management which would be placement of a cast and nonweightbearing for a total of 6  weeks. I will see him in clinic and place this cast. 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 If no old outside studies are available for comparison, short interval follow-up CT scan or brain MRI could be considered      Leukocytosis/afebrile with no compalints - UA neg, respiratory panel neg, chest xray ne, blood cultures     Chronic insomnia - holding Ambien given altered mental status     DM-II - consistent carb diet; check A1C 6.55     CKD stage 3 - creatinine is at baseline; avoid nephrotoxins       Plan for disposition:Inpt rehab    LANI Cerda  07/10/24  09:49 EDT          Electronically signed by Corie Ny APRN at 07/10/24 1335       Melva Jones APRN at 07/09/24 1124       Attestation signed by Sonia Valadez MD at 07/09/24 1700    I have reviewed this documentation and agree.                       LOS: 0 days   Patient Care Team:  Sonia Valadez MD as PCP - General (Internal Medicine)    Subjective     C/o of leg pain    Review of Systems   Constitutional:  Positive for activity change, appetite change and fatigue.   HENT: Negative.     Respiratory: Negative.     Cardiovascular:  Positive for leg swelling.   Gastrointestinal: Negative.    Genitourinary: Negative.    Musculoskeletal:  Positive for gait problem.   Neurological:  Positive for weakness.   Psychiatric/Behavioral: Negative.             Objective     Vital Signs  Temp:  [97.4 °F (36.3 °C)-97.8 °F (36.6 °C)] 97.4 °F (36.3 °C)  Heart Rate:  [65-96] 65  Resp:  [16-18] 16  BP: (117-151)/(61-94) 117/70      Physical Exam  Vitals reviewed.   Constitutional:       Appearance: He is not ill-appearing.   HENT:      Head:  Normocephalic and atraumatic.      Right Ear: External ear normal.      Left Ear: External ear normal.      Nose: Nose normal.      Mouth/Throat:      Mouth: Mucous membranes are moist.   Eyes:      General:         Right eye: No discharge.         Left eye: No discharge.   Cardiovascular:      Rate and Rhythm: Normal rate and regular rhythm.      Pulses: Normal pulses.      Heart sounds: Normal heart sounds.   Pulmonary:      Effort: Pulmonary effort is normal.      Breath sounds: Normal breath sounds.   Abdominal:      General: Bowel sounds are normal.      Palpations: Abdomen is soft.   Musculoskeletal:         General: Normal range of motion.      Cervical back: Normal range of motion.   Skin:     General: Skin is warm and dry.   Neurological:      Mental Status: He is alert and oriented to person, place, and time.   Psychiatric:         Behavior: Behavior normal.              Results Review:    Lab Results (last 24 hours)       Procedure Component Value Units Date/Time    Urine Drug Screen - Urine, Clean Catch [[id number]]  (Abnormal) Collected: 07/09/24 0756    Specimen: Urine, Clean Catch Updated: 07/09/24 0829     Amphet/Methamphet, Screen Negative     Barbiturates Screen, Urine Negative     Benzodiazepine Screen, Urine Negative     Cocaine Screen, Urine Negative     Opiate Screen Positive     THC, Screen, Urine Negative     Methadone Screen, Urine Negative     Oxycodone Screen, Urine Negative    Narrative:      Negative Thresholds Per Drugs Screened:    Amphetamines                 500 ng/ml  Barbiturates                 200 ng/ml  Benzodiazepines              100 ng/ml  Cocaine                      300 ng/ml  Methadone                    300 ng/ml  Opiates                      300 ng/ml  Oxycodone                    100 ng/ml  THC                           50 ng/ml    The Normal Value for all drugs tested is negative. This report includes final unconfirmed screening results to be used for medical treatment  purposes only. Unconfirmed results must not be used for non-medical purposes such as employment or legal testing. Clinical consideration should be applied to any drug of abuse test, particularly when unconfirmed results are used.          All urine drugs of abuse requests without chain of custody are for medical screening purposes only.  False positives are possible.      Urinalysis With Culture If Indicated - Urine, Clean Catch [[id number]]  (Abnormal) Collected: 07/09/24 0756    Specimen: Urine, Clean Catch Updated: 07/09/24 0808     Color, UA Yellow     Appearance, UA Clear     pH, UA <=5.0     Specific Gravity, UA 1.028     Glucose, UA >=1000 mg/dL (3+)     Ketones, UA Negative     Bilirubin, UA Negative     Blood, UA Negative     Protein, UA Trace     Leuk Esterase, UA Negative     Nitrite, UA Negative     Urobilinogen, UA 0.2 E.U./dL    Narrative:      In absence of clinical symptoms, the presence of pyuria, bacteria, and/or nitrites on the urinalysis result does not correlate with infection.  Urine microscopic not indicated.    POC Glucose Once [[id number]]  (Abnormal) Collected: 07/08/24 2111    Specimen: Blood Updated: 07/08/24 2113     Glucose 192 mg/dL      Comment: Serial Number: [id number]:  [id number]       Respiratory Panel PCR w/COVID-19(SARS-CoV-2) ALFONSO/POOL/ANASTASIA/PAD/COR/MARY In-House, NP Swab in UTM/VTM, 2 HR TAT - Swab, Nasopharynx [[id number]]  (Normal) Collected: 07/08/24 1921    Specimen: Swab from Nasopharynx Updated: 07/08/24 2015     ADENOVIRUS, PCR Not Detected     Coronavirus 229E Not Detected     Coronavirus HKU1 Not Detected     Coronavirus NL63 Not Detected     Coronavirus OC43 Not Detected     COVID19 Not Detected     Human Metapneumovirus Not Detected     Human Rhinovirus/Enterovirus Not Detected     Influenza A PCR Not Detected     Influenza B PCR Not Detected     Parainfluenza Virus 1 Not Detected     Parainfluenza Virus 2 Not Detected     Parainfluenza Virus 3 Not Detected      Parainfluenza Virus 4 Not Detected     RSV, PCR Not Detected     Bordetella pertussis pcr Not Detected     Bordetella parapertussis PCR Not Detected     Chlamydophila pneumoniae PCR Not Detected     Mycoplasma pneumo by PCR Not Detected    Narrative:      In the setting of a positive respiratory panel with a viral infection PLUS a negative procalcitonin without other underlying concern for bacterial infection, consider observing off antibiotics or discontinuation of antibiotics and continue supportive care. If the respiratory panel is positive for atypical bacterial infection (Bordetella pertussis, Chlamydophila pneumoniae, or Mycoplasma pneumoniae), consider antibiotic de-escalation to target atypical bacterial infection.    Ammonia [[id number]]  (Normal) Collected: 07/08/24 1921    Specimen: Blood Updated: 07/08/24 1947     Ammonia 17 umol/L     CK [[id number]]  (Normal) Collected: 07/08/24 1459    Specimen: Blood Updated: 07/08/24 1931     Creatine Kinase 138 U/L     Blood Culture - Blood, Arm, Left [[id number]] Collected: 07/08/24 1921    Specimen: Blood from Arm, Left Updated: 07/08/24 1925    Hemoglobin A1c [[id number]]  (Abnormal) Collected: 07/08/24 1459    Specimen: Blood Updated: 07/08/24 1851     Hemoglobin A1C 6.55 %     COVID-19,CEPHEID/SELMA,COR/ANASTASIA/PAD/MITESH/LAG/MARY IN-HOUSE,NP SWAB IN TRANSPORT MEDIA 1 HR TAT, RT-PCR - Swab, Nasopharynx [[id number]]  (Normal) Collected: 07/08/24 1801    Specimen: Swab from Nasopharynx Updated: 07/08/24 1844     COVID19 Not Detected    Narrative:      Fact sheet for providers: https://www.fda.gov/media/113588/download     Fact sheet for patients: https://www.fda.gov/media/035921/download  Fact sheet for providers: https://www.fda.gov/media/765594/download    Fact sheet for patients: https://www.fda.gov/media/150399/download    Test performed by PCR.    Comprehensive Metabolic Panel [[id number]]  (Abnormal) Collected: 07/08/24 1459    Specimen: Blood Updated: 07/08/24 1751      Glucose 141 mg/dL      BUN 33 mg/dL      Creatinine 1.87 mg/dL      Sodium 141 mmol/L      Potassium 3.7 mmol/L      Chloride 104 mmol/L      CO2 21.1 mmol/L      Calcium 9.7 mg/dL      Total Protein 7.7 g/dL      Albumin 4.6 g/dL      ALT (SGPT) 26 U/L      AST (SGOT) 26 U/L      Alkaline Phosphatase 134 U/L      Total Bilirubin 0.5 mg/dL      Globulin 3.1 gm/dL      A/G Ratio 1.5 g/dL      BUN/Creatinine Ratio 17.6     Anion Gap 15.9 mmol/L      eGFR 36.3 mL/min/1.73     Narrative:      GFR Normal >60  Chronic Kidney Disease <60  Kidney Failure <15    The GFR formula is only valid for adults with stable renal function between ages 18 and 70.    CBC & Differential [[id number]]  (Abnormal) Collected: 07/08/24 1459    Specimen: Blood Updated: 07/08/24 1710    Narrative:      The following orders were created for panel order CBC & Differential.  Procedure                               Abnormality         Status                     ---------                               -----------         ------                     CBC Auto Differential[[id number]]        Abnormal            Final result                 Please view results for these tests on the individual orders.    CBC Auto Differential [[id number]]  (Abnormal) Collected: 07/08/24 1459    Specimen: Blood Updated: 07/08/24 1710     WBC 20.35 10*3/mm3      RBC 4.77 10*6/mm3      Hemoglobin 15.2 g/dL      Hematocrit 45.9 %      MCV 96.2 fL      MCH 31.9 pg      MCHC 33.1 g/dL      RDW 16.0 %      RDW-SD 57.6 fl      MPV 10.7 fL      Platelets 469 10*3/mm3      Neutrophil % 86.5 %      Lymphocyte % 4.3 %      Monocyte % 8.3 %      Eosinophil % 0.0 %      Basophil % 0.1 %      Immature Grans % 0.8 %      Neutrophils, Absolute 17.59 10*3/mm3      Lymphocytes, Absolute 0.88 10*3/mm3      Monocytes, Absolute 1.69 10*3/mm3      Eosinophils, Absolute 0.00 10*3/mm3      Basophils, Absolute 0.03 10*3/mm3      Immature Grans, Absolute 0.16 10*3/mm3      nRBC 0.0 /100 WBC      [place] Draw [[id number]] Collected: 07/08/24 1459    Specimen: Blood Updated: 07/08/24 1515    Narrative:      The following orders were created for panel order [place] Draw.  Procedure                               Abnormality         Status                     ---------                               -----------         ------                     Green Top (Gel)[[id number]]                                  Final result               Lavender Top[[id number]]                                     Final result               Gold Top - SST[[id number]]                                   Final result               Light Blue Top[[id number]]                                   Final result                 Please view results for these tests on the individual orders.    Green Top (Gel) [[id number]] Collected: 07/08/24 1459    Specimen: Blood Updated: 07/08/24 1515     Extra Tube Hold for add-ons.     Comment: Auto resulted.       Lavender Top [[id number]] Collected: 07/08/24 1459    Specimen: Blood Updated: 07/08/24 1515     Extra Tube hold for add-on     Comment: Auto resulted       Gold Top - SST [[id number]] Collected: 07/08/24 1459    Specimen: Blood Updated: 07/08/24 1515     Extra Tube Hold for add-ons.     Comment: Auto resulted.       Light Blue Top [[id number]] Collected: 07/08/24 1459    Specimen: Blood Updated: 07/08/24 1515     Extra Tube Hold for add-ons.     Comment: Auto resulted                Imaging Results (Last 24 Hours)       Procedure Component Value Units Date/Time    XR Ankle 3+ View Right [[id number]] Collected: 07/09/24 0852     Updated: 07/09/24 0857    Narrative:      XR ANKLE 3+ VW RIGHT    Date of Exam: 7/9/2024 8:39 AM EDT    Indication: in boot to see if fibula fracture moved    Comparison: 7/8/2024.    Findings: 3 views through both. Fracture at the base of the lateral malleolus appears unchanged in alignment with slight lateral displacement of distal fracture fragments in relation to proximal. There is  a small avulsion from the tip of the medial   malleolus although may be old. The ankle joint is maintained.      Impression:      Impression:  Distal fibular fracture is unchanged in alignment.      Electronically Signed: Landy Stockton MD    7/9/2024 8:54 AM EDT    Workstation ID: [id number]    CT Head Without Contrast [[id number]] Collected: 07/09/24 0722     Updated: 07/09/24 0736    Narrative:      CT HEAD WO CONTRAST    Date of Exam: 7/9/2024 3:50 AM EDT    Indication: frequent falls, unwitnessed.    Comparison: None available.    Technique: Axial CT images were obtained of the head without contrast administration.  Coronal reconstructions were performed.  Automated exposure control and iterative reconstruction methods were used.      Findings:  The calvarium appears intact. Included paranasal sinuses and mastoids appear clear. Soft tissue axial images show expected degree of generalized cerebral atrophy for the patient's age. No evidence of acute infarction, mass, mass effect or hydrocephalus   is seen.     A small curvilinear, extra-axial intermediate density along the left frontotemporal convexity, initial axial images 24 through 27, coronal images 26 and 25, is favored to represent a small dural varix rather than an unusual small aging subdural hematoma,   and there is no evidence of subdural hematoma or other evidence of hemorrhage elsewhere. There are chronic appearing central white matter changes, mostly in the left frontal white matter, but no obvious infarction..      Impression:      Impression:    1. Generalized cerebral atrophy, expected for age.    2. Small extra-axial density along the left frontotemporal convexity favored to represent a small incidental dural varix, rather than meningioma or trace aging subdural hematoma. If no old outside studies are available for comparison, short interval   follow-up CT scan or brain MRI could be considered.      Electronically Signed: Boo Lundy MD    7/9/2024  7:34 AM EDT    Workstation ID: [id number]    MRI Hip Right Without Contrast [[id number]] Collected: 07/09/24 0730     Updated: 07/09/24 0736    Narrative:      MRI HIP RIGHT WO CONTRAST    Date of Exam: 7/8/2024 7:55 PM EDT    Indication: right hip pain, frequent falls, abnormal ct.     Comparison: Hip CT 7/8/2024    Technique:  Routine multiplanar/multisequence images of the right hip were obtained without contrast administration.        Findings:  OSSEOUS STRUCTURES  No fracture, stress reaction, avascular necrosis, or focal osseous lesion is seen. Bone marrow signal intensity is normal. Moderate joint space narrowing both hips.    ARTICULAR CARTILAGE AND LABRUM  Articular cartilage: There is no joint space narrowing or cartilage disease.  Labrum: No labral tear or paralabral cyst is identified. The ligamentum teres is normal.  The alpha angle is  within normal limits (<55-60%).    JOINT OR BURSAL EFFUSION  Joint effusion: There is no significant joint effusion.  Bursal effusion: No iliopsoas or trochanteric bursal effusion is present.    MUSCLES AND TENDONS  The rectus femoris, iliopsoas, proximal hamstrings, quadratus femoris, and hip abductors are intact.    OTHER FINDINGS  Miscellaneous: Degenerative disc disease with disc desiccation and disc height loss. Moderate facet degenerative change lumbar spine. There are diverticuli in the colon. There is a urinary bladder diverticulum on the right measuring 1.8 cm.  Motion limits evaluation. However this is a diagnostic exam.    Impression:      No fractures of the hip.  Moderate arthritis of the hips.  Degenerative disc disease and facet degenerative change lumbar spine.      Electronically Signed: Oralia Tanner MD    7/9/2024 7:34 AM EDT    Workstation ID: [id number]    XR Chest 1 View [[id number]] Collected: 07/08/24 2219     Updated: 07/08/24 2222    Narrative:      XR CHEST 1 VW    Date of Exam: 7/8/2024 6:58 PM EDT    Indication: leukocytosis, confusion    Comparison:  None available.    Findings:  Lines: None    Lungs: The lungs are clear.  Pleura: Pleural thickening at the left lung apex. Questionable trace left apical pneumothorax.    Cardiomediastinum: The cardiomediastinal silhouette is normal    Soft Tissues: Unremarkable.    Bones: No acute osseous abnormality.      Impression:      Impression:  There is pleural thickening with questionable trace left apical pneumothorax, most likely artifactual. If further evaluation is warranted, please consider dedicated CT of the chest.    Otherwise no definite acute cardiopulmonary abnormality      Electronically Signed: Walker Farfan DO    7/8/2024 10:20 PM EDT    Workstation ID: [id number]    CT Lower Extremity Right Without Contrast [[id number]] Collected: 07/08/24 1646     Updated: 07/08/24 1653    Narrative:      CT LOWER EXTREMITY RIGHT WO CONTRAST    Date of Exam: 7/8/2024 4:42 PM EDT    Indication: right hip pain/ falls neg xray.    Comparison: None available.    Technique: Axial CT images were obtained of the right lower extremity without contrast administration.  Sagittal and coronal reconstructions were performed.  Automated exposure control and iterative reconstruction methods were used.      Findings:  Osteopenia limits evaluation for nondisplaced fractures. If concern for radiographically occult fracture MRI study of choice. There is questionable nondisplaced fracture at the femoral neck best seen on the axial image #158. Avascular channel cannot be   excluded. MRI of the hip is recommended if patient has difficulty weightbearing or pain.    There is moderate degenerative change of the hip joint. There are no aggressive osseous lesions.    No significant joint effusion is seen on CT. There are vascular calcifications. There are diverticuli in the colon.      Impression:      Impression:  CT of the hip is nondiagnostic for hip fracture. There is a questionable nondisplaced fracture of the femoral neck could be seen with  nondisplaced fracture or vascular groove. If patient has difficulty weightbearing or pain, MRI of the hip is recommended   for better evaluation.  Osteopenia limits evaluation.  Moderate arthritis of the hip.        Electronically Signed: rOalia Tanner MD    7/8/2024 4:50 PM EDT    Workstation ID: [id number]    XR Ankle 3+ View Right [[id number]] Collected: 07/08/24 1526     Updated: 07/08/24 1530    Narrative:      XR ANKLE 3+ VW RIGHT    Date of Exam: 7/8/2024 3:18 PM EDT    Indication: pain after falling    Comparison: None available.    Findings:  Minimally displaced fracture through the distal fibula/lateral malleolus. The remainder of the osseous structures are intact.    The ankle mortise is intact.  Mild degenerative changes of the ankle.    Soft tissue edema surrounding the ankle.      Impression:      Impression:  Minimally displaced fracture through the distal fibula/lateral malleolus.      Electronically Signed: Walker Farfan DO    7/8/2024 3:28 PM EDT    Workstation ID: [id number]                 I reviewed the patient's new clinical results.    Medication Review:   Scheduled Meds:famotidine, 40 mg, Oral, Daily  sodium chloride, 10 mL, Intravenous, Q12H      Continuous Infusions:   PRN Meds:.  acetaminophen    senna-docusate sodium **AND** polyethylene glycol **AND** bisacodyl **AND** bisacodyl    Calcium Replacement - Follow Nurse / BPA Driven Protocol    HYDROcodone-acetaminophen    Magnesium Standard Dose Replacement - Follow Nurse / BPA Driven Protocol    ondansetron    Phosphorus Replacement - Follow Nurse / BPA Driven Protocol    Potassium Replacement - Follow Nurse / BPA Driven Protocol    [COMPLETED] Insert Peripheral IV **AND** sodium chloride    sodium chloride    [COMPLETED] Insert Peripheral IV **AND** sodium chloride    sodium chloride    sodium chloride     Interval History:    Assessment & Plan     Right hip pain  - difficulty bearing weight  -MRI negative for acute findings     Right  fibula fracture  -per ortho very minimally displaced and could be amenable to nonoperative management which would be placement of a cast and nonweightbearing for a total of 6 weeks. I will see him in clinic and place this cast. 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 If no old outside studies are available for comparison, short interval follow-up CT scan or brain MRI could be considered      Leukocytosis/afebrile with no compalints - UA neg, respiratory panel neg, chest xray ne, blood cultures     Chronic insomnia - holding Ambien given altered mental status     DM-II - consistent carb diet; check A1C 6.55     CKD stage 3 - creatinine is at baseline; avoid nephrotoxins       Plan for disposition:Inpt rehab    Melva JonesLANI  07/09/24  11:24 EDT          Electronically signed by Sonia Valadez MD at 07/09/24 1700          Consult Notes (all)        Marianna Chiu MD at 07/09/24 0707        Consult Orders    1. Inpatient Orthopedic Surgery Consult [[id number]] ordered by Sonia Valadez MD at 07/08/24 1828                   Orthopaedic Consult    Marianna Chiu MD      Patient: Yuan Salas    Date of Admission: 7/8/2024  3:03 PM    YOB: 1945    Medical Record Number: [id number]    Attending Physician: Sonia Valadez MD  Consulting Physician: Marianna Chiu MD      Chief Complaints: Right hip pain [M25.551]  Multiple falls [R29.6]  Closed nondisplaced fracture of lateral malleolus of right fibula, initial encounter [S82.64XA]  Acute hip pain, right [M25.551]      History of Present Illness: 79 y.o. male admitted to [facility] with Right hip pain [M25.551]  Multiple falls  [R29.6]  Closed nondisplaced fracture of lateral malleolus of right fibula, initial encounter [S82.64XA]  Acute hip pain, right [M25.551]. I was consulted for further evaluation and treatment. Onset of symptoms was yesterday.  He lives alone.  He reports he fell going to the bathroom and fell between the couch.  He landed on his right ankle and his right hip.  He believes most of the pain is sciatic and related of his hip.  He is complaining of pain in his ankle mostly on the lateral side.  He reports he was a diabetic but is diet controlled.  He denies use of tobacco products.  Overall he reports he is relatively healthy does not use an assistive device at home to walk.     Allergies: No Known Allergies    Medications:   Home Medications:  Medications Prior to Admission   Medication Sig Dispense Refill Last Dose    allopurinol (ZYLOPRIM) 100 MG tablet Take 1 tablet by mouth Daily.   7/7/2024    amLODIPine (NORVASC) 5 MG tablet Take 1 tablet by mouth Daily.   7/7/2024    atorvastatin (LIPITOR) 40 MG tablet Take 1 tablet by mouth Daily.   7/7/2024    dapagliflozin Propanediol (Farxiga) 10 MG tablet Take 10 mg by mouth Daily.   7/8/2024    FLUoxetine (PROzac) 40 MG capsule Take 1 capsule by mouth Daily.   7/8/2024    levothyroxine (SYNTHROID, LEVOTHROID) 50 MCG tablet Take 1 tablet by mouth Daily.   7/8/2024    pioglitazone (ACTOS) 30 MG tablet Take 1 tablet by mouth Daily.   7/8/2024    zolpidem (Ambien) 10 MG tablet Take 1 tablet by mouth At Night As Needed for Sleep.   7/7/2024    methocarbamol (ROBAXIN) 750 MG tablet Take 1 tablet by mouth 3 (Three) Times a Day As Needed for Muscle Spasms.          Current Medications:  Scheduled Meds:famotidine, 40 mg, Oral, Daily  sodium chloride, 10 mL, Intravenous, Q12H      Continuous Infusions:   PRN Meds:.  acetaminophen    senna-docusate sodium **AND** polyethylene glycol **AND** bisacodyl **AND** bisacodyl    Calcium Replacement - Follow Nurse / BPA Driven Protocol     HYDROcodone-acetaminophen    Magnesium Standard Dose Replacement - Follow Nurse / BPA Driven Protocol    ondansetron    Phosphorus Replacement - Follow Nurse / BPA Driven Protocol    Potassium Replacement - Follow Nurse / BPA Driven Protocol    [COMPLETED] Insert Peripheral IV **AND** sodium chloride    sodium chloride    [COMPLETED] Insert Peripheral IV **AND** sodium chloride    sodium chloride    sodium chloride    Past Medical History:   Diagnosis Date    Asthma     Chronic headaches     Diabetes mellitus     Disease of thyroid gland     Gout     Hyperlipidemia     Hypertension     Sleep apnea     does not use cpap or bipap     Past Surgical History:   Procedure Laterality Date    CATARACT EXTRACTION      RETINAL DETACHMENT SURGERY Bilateral      Social History     Occupational History    Not on file   Tobacco Use    Smoking status: Never    Smokeless tobacco: Never   Vaping Use    Vaping status: Never Used   Substance and Sexual Activity    Alcohol use: Never    Drug use: Never    Sexual activity: Not on file      Social History     Social History Narrative    Not on file     History reviewed. No pertinent family history.      Review of Systems:   Constitutional: Negative for fatigue, fever, or weight loss  HEENT: Patient denies any headaches, vision changes, sore throat. Admits to wearing glasses  Pulmonary: Patient denies any cough, congestion, SOA, or wheezing  Cardiovascular: Patient denies any chest pain, palpitations, weakness,  Gastrointestinal:  Patient denies nausea, vomiting, diarrhea, constipation  Genital/Urinary: Patient denies dysuria, urinary frequency, urgency, incontinence, retention  Musculoskeletal: Positive for right hip pain mostly sciatic in nature and ankle pain. Negative for muscle cramps  Neurological: Patient denies dizziness, paresthesia, loss of sensation, seizures, syncope  Endocrine system: Patient denies tremors, cold or heat intolerance  Psychological: Patient denies  "thoughts/plans or harming self or other; hallucinations,  insomnia  Skin: Patient denies any bruising, rashes, lesions, or ulcers   Hematopoietic: Patient denies history of MRSA or slow wound healing,  spontaneous or excessive bleeding    Physical Exam: 79 y.o. male  Vitals:    07/08/24 1802 07/08/24 2102 07/09/24 0035 07/09/24 0428   BP: 129/61 122/70 127/71 121/74   BP Location:  Right arm Right arm Right arm   Patient Position:  Lying Lying Lying   Pulse: 79 89 94 85   Resp: 18 18 18 16   Temp:  97.6 °F (36.4 °C) 97.6 °F (36.4 °C) 97.5 °F (36.4 °C)   TempSrc:  Oral Oral Oral   SpO2: 91% 93% 98% 91%   Weight:  82.4 kg (181 lb 10.5 oz)     Height:  172.7 cm (68\")                              General Appearance:  Alert, cooperative, in no acute distress    HEENT:    Normocephalic,  Atraumatic, Pupils are equal   Neck:   No adenopathy, supple, trachea midline, no thyromegaly       Lungs:     Clear to auscultation, equal expansion bilaterally, respirations regular, even and               unlabored    Heart:    Regular rhythm and normal rate, normal S1 and S2, no murmur, no gallops   Chest Wall:    Within normal limits   Abdomen:     Normal bowel sounds, no masses,  soft non-tender, non-distended,       Rectal:  Musculoskeletal:     Deferred  He is nontender with a logroll of the right lower extremity.  He is able to flex and extend through the hip.  He is nontender over the greater trochanter.  He reports most of the pain is located in the back of his hip radiating down his leg.  He is tender palpation over his lateral ankle there is appropriate ecchymoses.  He is able to flex and extend all of his toes neuro intact   Extremities:   No clubbing, no edema, no cyanosis   Pulses  Neurovascular:   Pulses palpable and equal bilaterally in all 4 extremities    Patient was alert and oriented x 3 spheres   Skin:   No lesions, ulcers or rashes   Neurologic:   Cranial nerves 2 - 12 grossly intact, sensation intact        "     Diagnostic Tests:    I reviewed his CT and MRI of his right femur I do not personally appreciate any fractures.  I will wait to the final read of the radiologist is made.  I do believe this is okay for no management from orthopedic standpoint.  He is symptomatic from sciatica     3 views of his right ankle demonstrate that there is a mildly displaced lateral malleolus fracture.            Assessment:    Right hip pain    Right sciatic pain possible hip fracture  Right lateral malleolus fracture        Plan:      I will get x-rays of his ankle in the boot and follow-up on this.  This is very minimally displaced and could be amenable to nonoperative management which would be placement of a cast and nonweightbearing for a total of 6 weeks.  I will see him in clinic and place this cast.  Discussed with the patient if he does have movement of his lateral malleolus in the boot we would have a operative discussion.  Questions encouraged and answered no guarantees given.    Date: 7/9/2024  Marianna Chiu MD  Orthopaedic Surgeon    [facility]  [phone]  [email or web address]                    Electronically signed by Marianna Chiu MD at 07/09/24 [id number]

## 2024-07-10 NOTE — PLAN OF CARE
Assessment: Yuan Salas presents with ADL impairments affecting function including balance, cognition, endurance / activity tolerance, pain, postural / trunk control, range of motion (ROM), and strength. Demonstrated functioning below baseline abilities indicate the need for continued skilled intervention while inpatient. Pt demo cognitive deficits, pt oriented to month/year, name and occasionally appears to understand place, however demo executive function deficits. Pt highly distractible, poor attention to task, poor problem solving, and decreased divided attention. Pt noted to be sitting edge of bed upon arrival, attempting to don pants, notably bearing weight through RLE. Pt requires continuous reminders to adhere to NWB status, however pt demo limited understanding. Pt is at high risk for further injuring RLE, is not safe to dc home at this time. Pt demo good activity tolerance and likely to tolerate high intensity with therapy, OT continues to recommend acute IP rehab when medically appropriate for dc. Tolerating session today without incident. Will continue to follow and progress as tolerated.     Plan/Recommendations:   High Intensity Therapy recommended post-acute care. This is recommended as therapy feels the patient would require 5-6 days per week, 2-3 hours per day. At this time, inpatient rehabilitation (acute rehab) would be the first choice and SNF would be second.. Pt requires no DME at discharge.     Pt desires Inpatient Rehabilitation placement at discharge. Pt cooperative; agreeable to therapeutic recommendations and plan of care.

## 2024-07-10 NOTE — SIGNIFICANT NOTE
Case Management/Social Work    Patient Name:  Yuan Salas  YOB: 1945  MRN: [id number]  Admit Date:  7/8/2024           07/10/24 1524   Post Acute Pre-Cert Documentation   Request Submitted by Facility - Type: Hospital   Post-Acute Authorization Type Submitted: SNF   Date Post Acute Pre-Cert Inititated per Facility 07/10/24   Verification from Payer Yes   Date Post Acute Pre-Cert Completed 07/10/24   Accepting Facility [facility] Discharge Date Requested 07/10/24   All Clinicals Submitted? Yes   Had Accepting Facility at Time of Submission Yes   Response Received from Insurance? Approval   Response Communicated to:    Authorization Number: [id number]   Post Acute Pre-Cert Initiated Comment VIOLET submitted SNF precert for [facility] via [place] [facility] portal. Portal auth ID [id number]. SNF precert auto approved. Valid 7/10-7/12. CM made aware.           Electronically signed by:  Satish Douglas CMA  07/10/24 15:26 EDT    Satish Douglas  Case Management Associate  [address] [address]  P: [phone]  F: [phone]

## 2024-07-11 PROBLEM — B88.0 INFESTATION BY DEMODEX: Status: ACTIVE | Noted: 2023-11-08

## 2024-07-11 LAB
AMMONIA BLD-SCNC: 25 UMOL/L (ref 16–60)
GLUCOSE BLDC GLUCOMTR-MCNC: 108 MG/DL (ref 70–105)
GLUCOSE BLDC GLUCOMTR-MCNC: 136 MG/DL (ref 70–105)
GLUCOSE BLDC GLUCOMTR-MCNC: 142 MG/DL (ref 70–105)

## 2024-07-11 PROCEDURE — 97535 SELF CARE MNGMENT TRAINING: CPT

## 2024-07-11 PROCEDURE — 97551 CAREGIVER TRAING EA ADDL 15: CPT

## 2024-07-11 PROCEDURE — 82948 REAGENT STRIP/BLOOD GLUCOSE: CPT | Performed by: FAMILY MEDICINE

## 2024-07-11 PROCEDURE — 97530 THERAPEUTIC ACTIVITIES: CPT

## 2024-07-11 PROCEDURE — 97110 THERAPEUTIC EXERCISES: CPT

## 2024-07-11 PROCEDURE — 82948 REAGENT STRIP/BLOOD GLUCOSE: CPT

## 2024-07-11 PROCEDURE — 97116 GAIT TRAINING THERAPY: CPT

## 2024-07-11 PROCEDURE — 97129 THER IVNTJ 1ST 15 MIN: CPT

## 2024-07-11 PROCEDURE — 82140 ASSAY OF AMMONIA: CPT | Performed by: FAMILY MEDICINE

## 2024-07-11 RX ORDER — DEXTROSE MONOHYDRATE 25 G/50ML
25 INJECTION, SOLUTION INTRAVENOUS
Status: DISCONTINUED | OUTPATIENT
Start: 2024-07-11 | End: 2024-07-12 | Stop reason: HOSPADM

## 2024-07-11 RX ORDER — PIOGLITAZONEHYDROCHLORIDE 30 MG/1
30 TABLET ORAL DAILY
Status: DISCONTINUED | OUTPATIENT
Start: 2024-07-11 | End: 2024-07-12 | Stop reason: HOSPADM

## 2024-07-11 RX ORDER — LEVOTHYROXINE SODIUM 0.05 MG/1
50 TABLET ORAL DAILY
Status: CANCELLED | OUTPATIENT
Start: 2024-07-11

## 2024-07-11 RX ORDER — FLUOXETINE HYDROCHLORIDE 20 MG/1
40 CAPSULE ORAL DAILY
Status: DISCONTINUED | OUTPATIENT
Start: 2024-07-11 | End: 2024-07-12 | Stop reason: HOSPADM

## 2024-07-11 RX ORDER — METHOCARBAMOL 750 MG/1
750 TABLET, FILM COATED ORAL 3 TIMES DAILY PRN
Status: DISCONTINUED | OUTPATIENT
Start: 2024-07-11 | End: 2024-07-12 | Stop reason: HOSPADM

## 2024-07-11 RX ORDER — ACETAMINOPHEN 325 MG/1
325 TABLET ORAL EVERY 4 HOURS PRN
Status: DISCONTINUED | OUTPATIENT
Start: 2024-07-11 | End: 2024-07-12 | Stop reason: HOSPADM

## 2024-07-11 RX ORDER — AMLODIPINE BESYLATE 5 MG/1
5 TABLET ORAL DAILY
Status: DISCONTINUED | OUTPATIENT
Start: 2024-07-11 | End: 2024-07-12 | Stop reason: HOSPADM

## 2024-07-11 RX ORDER — NICOTINE POLACRILEX 4 MG
15 LOZENGE BUCCAL
Status: DISCONTINUED | OUTPATIENT
Start: 2024-07-11 | End: 2024-07-12 | Stop reason: HOSPADM

## 2024-07-11 RX ORDER — ALLOPURINOL 100 MG/1
100 TABLET ORAL DAILY
Status: DISCONTINUED | OUTPATIENT
Start: 2024-07-11 | End: 2024-07-12 | Stop reason: HOSPADM

## 2024-07-11 RX ORDER — IBUPROFEN 600 MG/1
1 TABLET ORAL
Status: DISCONTINUED | OUTPATIENT
Start: 2024-07-11 | End: 2024-07-12 | Stop reason: HOSPADM

## 2024-07-11 RX ORDER — ATORVASTATIN CALCIUM 40 MG/1
40 TABLET, FILM COATED ORAL NIGHTLY
Status: DISCONTINUED | OUTPATIENT
Start: 2024-07-11 | End: 2024-07-12 | Stop reason: HOSPADM

## 2024-07-11 RX ORDER — INSULIN LISPRO 100 [IU]/ML
2-7 INJECTION, SOLUTION INTRAVENOUS; SUBCUTANEOUS
Status: DISCONTINUED | OUTPATIENT
Start: 2024-07-11 | End: 2024-07-12 | Stop reason: HOSPADM

## 2024-07-11 RX ORDER — ZOLPIDEM TARTRATE 5 MG/1
10 TABLET ORAL NIGHTLY PRN
Status: DISCONTINUED | OUTPATIENT
Start: 2024-07-11 | End: 2024-07-12 | Stop reason: HOSPADM

## 2024-07-11 RX ORDER — FLUOXETINE HYDROCHLORIDE 20 MG/1
40 CAPSULE ORAL DAILY
Status: CANCELLED | OUTPATIENT
Start: 2024-07-11

## 2024-07-11 RX ORDER — ATORVASTATIN CALCIUM 40 MG/1
40 TABLET, FILM COATED ORAL DAILY
Status: CANCELLED | OUTPATIENT
Start: 2024-07-11

## 2024-07-11 RX ORDER — ALLOPURINOL 100 MG/1
100 TABLET ORAL DAILY
Status: CANCELLED | OUTPATIENT
Start: 2024-07-11

## 2024-07-11 RX ORDER — AMLODIPINE BESYLATE 5 MG/1
5 TABLET ORAL DAILY
Status: CANCELLED | OUTPATIENT
Start: 2024-07-11

## 2024-07-11 RX ORDER — LEVOTHYROXINE SODIUM 0.05 MG/1
50 TABLET ORAL
Status: DISCONTINUED | OUTPATIENT
Start: 2024-07-11 | End: 2024-07-12 | Stop reason: HOSPADM

## 2024-07-11 RX ADMIN — OXYCODONE 5 MG: 5 TABLET ORAL at 19:54

## 2024-07-11 RX ADMIN — ALLOPURINOL 100 MG: 100 TABLET ORAL at 11:47

## 2024-07-11 RX ADMIN — EMPAGLIFLOZIN 25 MG: 25 TABLET, FILM COATED ORAL at 11:47

## 2024-07-11 RX ADMIN — Medication 10 ML: at 21:12

## 2024-07-11 RX ADMIN — OXYCODONE 5 MG: 5 TABLET ORAL at 03:51

## 2024-07-11 RX ADMIN — ATORVASTATIN CALCIUM 40 MG: 40 TABLET, FILM COATED ORAL at 21:12

## 2024-07-11 RX ADMIN — LEVOTHYROXINE SODIUM 50 MCG: 0.05 TABLET ORAL at 11:47

## 2024-07-11 RX ADMIN — FLUOXETINE HYDROCHLORIDE 40 MG: 20 CAPSULE ORAL at 11:47

## 2024-07-11 RX ADMIN — PIOGLITAZONE 30 MG: 30 TABLET ORAL at 11:47

## 2024-07-11 RX ADMIN — AMLODIPINE BESYLATE 5 MG: 5 TABLET ORAL at 11:48

## 2024-07-11 RX ADMIN — Medication 10 ML: at 10:09

## 2024-07-11 RX ADMIN — ACETAMINOPHEN 650 MG: 325 TABLET, FILM COATED ORAL at 01:42

## 2024-07-11 NOTE — PLAN OF CARE
Goal Outcome Evaluation:              Outcome Evaluation: Pt having episodes of confusion, easily redirected. No complaints of pain. CAM boot in place. Pt up in chair with chair alarm and family at bedside. Plan of care on going.

## 2024-07-11 NOTE — NURSING NOTE
Pt had more confusion this shift. UA was done on 07/09. Contacted on call provider Ruth Smith NP and was advised to continue watching pt for any urinary symptoms/changes and if his confusion worsens, that an order for another UA can be added.

## 2024-07-11 NOTE — PLAN OF CARE
"Goal Outcome Evaluation:     Assessment: Yuan Salas presents with ADL impairments affecting function including balance, cognition, and endurance / activity tolerance. Demonstrated functioning below baseline abilities indicate the need for continued skilled intervention while inpatient. Tolerating session today without incident. Pt is NWB to his RLE due to displaced rt lateral malleolar Fx but pt has been walking into the bathroom. It is difficult to get him to understand adaptations to stay off his ankle, though he is polite and compliant when given cues and assist, & he is able to hop 8' on one leg w/ RW and mod (A) of one and a second following with a chair, so good rehab potential. Not safe to be home alone. Will need SNF for rehab. Will continue to follow and progress as tolerated.     Plan/Recommendations:   Moderate Intensity Therapy recommended post-acute care. This is recommended as therapy feels the patient would require 3-4 days per week and wouldn't tolerate \"3 hour daily\" rehab intensity. SNF would be the preferred choice. If the patient does not agree to SNF, arrange HH or OP depending on home bound status. If patient is medically complex, consider LTACH.. Pt requires no DME at discharge.     Pt desires Skilled Rehab placement at discharge. Pt cooperative; agreeable to therapeutic recommendations and plan of care, though keeps reporting he is going home tomorrow despite repeated education that he is going to [facility] to which he verbalizes understanding and agreement.   "

## 2024-07-11 NOTE — PLAN OF CARE
"Goal Outcome Evaluation:                   Yuan Salas presents with functional mobility impairments which indicate the need for skilled intervention. Pt presented A & O x4 but displayed confusion throughout tx session. Pt requierd cues for proper hand placement and technique to perform STS with RW while maintaining NWB status on RLE. Pt initially performed \"scoots\" with LLE while attempting ambulation. This PTA educated pt on 3 point gait with pt able to ambulate ~8' fwd with RW/mod A and OT following with chair. Pt would benefit from SNF following hospital discharge. Tolerating session today without incident. Will continue to follow and progress as tolerated.                            "

## 2024-07-11 NOTE — PROGRESS NOTES
LOS: 1 day   Patient Care Team:  Sonia Valadez MD as PCP - General (Internal Medicine)    Subjective:  Events noted, confusion    Objective:   Afebrile      Review of Systems:   Review of Systems   Constitutional:  Positive for activity change.   Neurological:  Positive for weakness.   Psychiatric/Behavioral:  Positive for confusion.            Vital Signs  Temp:  [97.5 °F (36.4 °C)-98.1 °F (36.7 °C)] 97.5 °F (36.4 °C)  Heart Rate:  [62-97] 73  Resp:  [15-20] 20  BP: (102-159)/(58-76) 115/75    Physical Exam:  Physical Exam  Vitals and nursing note reviewed.          Radiology:  XR Ankle 3+ View Right    Result Date: 7/9/2024  Impression: Distal fibular fracture is unchanged in alignment. Electronically Signed: Landy Stockton MD  7/9/2024 8:54 AM EDT  Workstation ID: [id number]    CT Head Without Contrast    Result Date: 7/9/2024  Impression: 1. Generalized cerebral atrophy, expected for age. 2. Small extra-axial density along the left frontotemporal convexity favored to represent a small incidental dural varix, rather than meningioma or trace aging subdural hematoma. If no old outside studies are available for comparison, short interval follow-up CT scan or brain MRI could be considered. Electronically Signed: Boo Lundy MD  7/9/2024 7:34 AM EDT  Workstation ID: [id number]    MRI Hip Right Without Contrast    Result Date: 7/9/2024  No fractures of the hip. Moderate arthritis of the hips. Degenerative disc disease and facet degenerative change lumbar spine. Electronically Signed: Oralia Tanner MD  7/9/2024 7:34 AM EDT  Workstation ID: [id number]    XR Chest 1 View    Result Date: 7/8/2024  Impression: There is pleural thickening with questionable trace left apical pneumothorax, most likely artifactual. If further evaluation is warranted, please consider dedicated CT of the chest. Otherwise no definite acute cardiopulmonary abnormality Electronically Signed: Walker Farfan DO  7/8/2024 10:20 PM EDT  Workstation ID:  [id number]    CT Lower Extremity Right Without Contrast    Result Date: 7/8/2024  Impression: CT of the hip is nondiagnostic for hip fracture. There is a questionable nondisplaced fracture of the femoral neck could be seen with nondisplaced fracture or vascular groove. If patient has difficulty weightbearing or pain, MRI of the hip is recommended  for better evaluation. Osteopenia limits evaluation. Moderate arthritis of the hip. Electronically Signed: Oralia Tanner MD  7/8/2024 4:50 PM EDT  Workstation ID: [id number]    XR Ankle 3+ View Right    Result Date: 7/8/2024  Impression: Minimally displaced fracture through the distal fibula/lateral malleolus. Electronically Signed: Walker Farfan DO  7/8/2024 3:28 PM EDT  Workstation ID: [id number]        Results Review:     I reviewed the patient's new clinical results.  I reviewed the patient's new imaging results and agree with the interpretation.    Medication Review:   Scheduled Meds:sodium chloride, 10 mL, Intravenous, Q12H      Continuous Infusions:   PRN Meds:.  acetaminophen    senna-docusate sodium **AND** polyethylene glycol **AND** bisacodyl **AND** bisacodyl    Calcium Replacement - Follow Nurse / BPA Driven Protocol    Magnesium Standard Dose Replacement - Follow Nurse / BPA Driven Protocol    ondansetron    oxyCODONE    Phosphorus Replacement - Follow Nurse / BPA Driven Protocol    Potassium Replacement - Follow Nurse / BPA Driven Protocol    [COMPLETED] Insert Peripheral IV **AND** sodium chloride    sodium chloride    [COMPLETED] Insert Peripheral IV **AND** sodium chloride    sodium chloride    sodium chloride    Labs:    CBC    Results from last 7 days   Lab Units 07/08/24  1459   WBC 10*3/mm3 20.35*   HEMOGLOBIN g/dL 15.2   PLATELETS 10*3/mm3 469*     BMP   Results from last 7 days   Lab Units 07/08/24  1459   SODIUM mmol/L 141   POTASSIUM mmol/L 3.7   CHLORIDE mmol/L 104   CO2 mmol/L 21.1*   BUN mg/dL 33*   CREATININE mg/dL 1.87*   GLUCOSE mg/dL 141*      Cr Clearance Estimated Creatinine Clearance: 34.1 mL/min (A) (by C-G formula based on SCr of 1.87 mg/dL (H)).  Coag     HbA1C   Lab Results   Component Value Date    HGBA1C 6.55 (H) 07/08/2024    HGBA1C 6.30 (H) 08/02/2023    HGBA1C 6.4 (H) 07/22/2022     Blood Glucose   Glucose   Date/Time Value Ref Range Status   07/08/2024 2111 192 (H) 70 - 105 mg/dL Final     Comment:     Serial Number: [id number]:  [id number]     Infection   Results from last 7 days   Lab Units 07/08/24 1921   BLOODCX  No growth at 2 days     CMP   Results from last 7 days   Lab Units 07/08/24 1921 07/08/24  1459   SODIUM mmol/L  --  141   POTASSIUM mmol/L  --  3.7   CHLORIDE mmol/L  --  104   CO2 mmol/L  --  21.1*   BUN mg/dL  --  33*   CREATININE mg/dL  --  1.87*   GLUCOSE mg/dL  --  141*   ALBUMIN g/dL  --  4.6   BILIRUBIN mg/dL  --  0.5   ALK PHOS U/L  --  134*   AST (SGOT) U/L  --  26   ALT (SGPT) U/L  --  26   AMMONIA umol/L 17  --      UA    Results from last 7 days   Lab Units 07/09/24  0756   NITRITE UA  Negative     Radiology(recent) XR Ankle 3+ View Right    Result Date: 7/9/2024  Impression: Distal fibular fracture is unchanged in alignment. 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

## 2024-07-11 NOTE — PLAN OF CARE
Goal Outcome Evaluation:  Plan of Care Reviewed With: patient        Progress: improving  Outcome Evaluation: aox4, VSS, pt has c/o pain this shift, and treated per MAR. he has had some confusion with location twice this shift but easily able to be reoriented. Ambulates with standby assist to bathroom this shift. Call light within reach, plan of care ongoing

## 2024-07-11 NOTE — THERAPY TREATMENT NOTE
"Subjective: Pt agreeable to therapeutic plan of care. Pt expressed getting up and walking to RR despite being NWB through RLE.    Objective:     WB'ing status: R Lower Extremity Non Weight Bearing    Therapeutic Exercise: 10 Reps B LE AROM     Precautions: High falls risk and Weight-bearing restriction     Bed mobility - N/A or Not attempted.  Transfers - Min-A, Assist x 2, and with rolling walker  Ambulation - 8 feet Min-A, Mod-A, Assist x 2, and with rolling walker; 3-point gait    Vitals: WNL    Pain: 0 VAS   Location: N/a  Intervention for pain: N/A    Education: Provided education on the importance of mobility in the acute care setting, Verbal/Tactile Cues, Transfer Training, Gait Training, Energy conservation strategies, and WB'ing status    Assessment: Yuan Salas presents with functional mobility impairments which indicate the need for skilled intervention. Pt presented A & O x4 but displayed confusion throughout tx session. Pt requierd cues for proper hand placement and technique to perform STS with RW while maintaining NWB status on RLE. Pt initially performed \"scoots\" with LLE while attempting ambulation. This PTA educated pt on 3 point gait with pt able to ambulate ~8' fwd with RW/mod A and OT following with chair. Pt would benefit from SNF following hospital discharge. Tolerating session today without incident. Will continue to follow and progress as tolerated.     Plan/Recommendations:   If medically appropriate, High Intensity Therapy recommended post-acute care. This is recommended as therapy feels the patient would require 5-6 days per week, 2-3 hours per day. At this time, inpatient rehabilitation (acute rehab) would be the first choice and SNF would be second. Pt requires no DME at discharge.     Pt desires Inpatient Rehabilitation placement at discharge. Pt cooperative; agreeable to therapeutic recommendations and plan of care.         Basic Mobility 6-click:  Rollin = Total, A lot " = 2, A little = 3; 4 = None  Supine>Sit:   1 = Total, A lot = 2, A little = 3; 4 = None   Sit>Stand with arms:  1 = Total, A lot = 2, A little = 3; 4 = None  Bed>Chair:   1 = Total, A lot = 2, A little = 3; 4 = None  Ambulate in room:  1 = Total, A lot = 2, A little = 3; 4 = None  3-5 Steps with railin = Total, A lot = 2, A little = 3; 4 = None  Score: 13    Modified Ocho[place]: N/A = No pre-op stroke/TIA    Post-Tx Position: Up in Chair, Staff Present, Alarms activated, and Call light and personal items within reach  PPE: gloves and surgical mask

## 2024-07-11 NOTE — CASE MANAGEMENT/SOCIAL WORK
Continued Stay Note   Anastacio     Patient Name: Yuan Salas  MRN: [id number]  Today's Date: 7/11/2024    Admit Date: 7/8/2024    Plan: DC PLAN: [facility] Precert approved.([id number].  Valid 7/10-7/12.) PASRR approved. May need transport.       Discharge Plan       Row Name 07/11/24 0809       Plan    Plan DC PLAN: [facility] accepted. Precert approved.([id number].  Valid 7/10-7/12.) PASRR approved. May need transport.    Plan Comments Recieved message Precert approved.Auth #[id number]. SNF precert auto approved. Valid 7/10-7/12. Reached out to facility, okay to dc today. Bedside nurse and MD made aware, Pharmacy updated. May need transport.                      Expected Discharge Date and Time       Expected Discharge Date Expected Discharge Time    Jul 11, 2024           Edda Guillaume RN

## 2024-07-11 NOTE — THERAPY TREATMENT NOTE
Subjective: Pt agreeable to therapeutic plan of care.  Cognition: oriented to Person and Time, disoriented to Place and Situation, memory: Normal and confused. BIM quick screen for memory & concentration are normal but pt also demonstrates a great deal of confusion at the same time, arousal/alertness: Alert, Attentive, Inquisitive, Distracted, and Confused, safety/judgement: poor, and awareness of deficits: poor awareness of safety precaution and poor awareness of defits    Objective: NWB RLE; bashful about ADL in front of people, but with reassurance is engaged and grateful for assist. When left to himself he is not managing ADL or compliant w/ NWB.    Bed Mobility: N/A or Not attempted.   Functional Transfers: Min-A, Mod-A, Assist x 2, with rolling walker, and utilizing compensatory strategy     Balance: supported, dynamic, with UE support, and standing Mod-A, Assist x 2, and with rolling walker to maintain NWB.  Functional Ambulation: Mod-A, Assist x 2, with rolling walker, and utilizing compensatory strategy to hop 8' w/ RW on one leg.     Upper Body Dressing, Lower Body Dressing, Toileting, Grooming, and Bathing: Mod-A  ADL Position: supported standing and unsupported sitting  ADL Comments: pt agreeable with encouragement to complete ADL. Is noted to be bashful but is grateful for assist. Has been wearing the same pants, underwear for 4 days per his report and smells of urine. Uses urinal in sitting somewhat successfully.     Pain: 2 VAS  Location: states his sciatica is painful but not his ankle  Interventions for pain: Repositioned, Increased Activity, and Therapeutic Presence  Education: Provided education on the importance of mobility in the acute care setting, Verbal/Tactile Cues, ADL training, Transfer Training, Energy conservation strategies, and WB'ing status    Assessment: Yuan Salas presents with ADL impairments affecting function including balance, cognition, and endurance / activity tolerance.  "Demonstrated functioning below baseline abilities indicate the need for continued skilled intervention while inpatient. Tolerating session today without incident. Pt is NWB to his RLE due to displaced rt lateral malleolar Fx but pt has been walking into the bathroom. It is difficult to get him to understand adaptations to stay off his ankle, though he is polite and compliant when given cues and assist, & he is able to hop 8' on one leg w/ RW and mod (A) of one and a second following with a chair, so good rehab potential. Not safe to be home alone. Will need SNF for rehab. Will continue to follow and progress as tolerated.     Plan/Recommendations:   Moderate Intensity Therapy recommended post-acute care. This is recommended as therapy feels the patient would require 3-4 days per week and wouldn't tolerate \"3 hour daily\" rehab intensity. SNF would be the preferred choice. If the patient does not agree to SNF, arrange HH or OP depending on home bound status. If patient is medically complex, consider LTACH.. Pt requires no DME at discharge.     Pt desires Skilled Rehab placement at discharge. Pt cooperative; agreeable to therapeutic recommendations and plan of care, though keeps reporting he is going home tomorrow despite repeated education that he is going to [facility] to which he verbalizes understanding and agreement.     Modified [place]: 4 = Moderately severe disability (Unable to attend to own bodily needs without assistance, and unable to walk unassisted)     Post-Tx Position: Up in Chair, Alarms activated, and Call light and personal items within reach  PPE: gloves    "

## 2024-07-12 VITALS
OXYGEN SATURATION: 95 % | SYSTOLIC BLOOD PRESSURE: 132 MMHG | WEIGHT: 181.66 LBS | RESPIRATION RATE: 20 BRPM | DIASTOLIC BLOOD PRESSURE: 75 MMHG | HEART RATE: 92 BPM | TEMPERATURE: 97.6 F | BODY MASS INDEX: 27.53 KG/M2 | HEIGHT: 68 IN

## 2024-07-12 LAB
ANION GAP SERPL CALCULATED.3IONS-SCNC: 18.9 MMOL/L (ref 5–15)
BUN SERPL-MCNC: 38 MG/DL (ref 8–23)
BUN/CREAT SERPL: 23.8 (ref 7–25)
CALCIUM SPEC-SCNC: 8.8 MG/DL (ref 8.6–10.5)
CHLORIDE SERPL-SCNC: 101 MMOL/L (ref 98–107)
CO2 SERPL-SCNC: 17.1 MMOL/L (ref 22–29)
CREAT SERPL-MCNC: 1.6 MG/DL (ref 0.76–1.27)
DEPRECATED RDW RBC AUTO: 56.5 FL (ref 37–54)
EGFRCR SERPLBLD CKD-EPI 2021: 43.8 ML/MIN/1.73
ERYTHROCYTE [DISTWIDTH] IN BLOOD BY AUTOMATED COUNT: 15.8 % (ref 12.3–15.4)
GLUCOSE BLDC GLUCOMTR-MCNC: 77 MG/DL (ref 70–105)
GLUCOSE SERPL-MCNC: 79 MG/DL (ref 65–99)
HCT VFR BLD AUTO: 40 % (ref 37.5–51)
HGB BLD-MCNC: 13 G/DL (ref 13–17.7)
MCH RBC QN AUTO: 31.4 PG (ref 26.6–33)
MCHC RBC AUTO-ENTMCNC: 32.5 G/DL (ref 31.5–35.7)
MCV RBC AUTO: 96.6 FL (ref 79–97)
PLATELET # BLD AUTO: 303 10*3/MM3 (ref 140–450)
PMV BLD AUTO: 10 FL (ref 6–12)
POTASSIUM SERPL-SCNC: 3.8 MMOL/L (ref 3.5–5.2)
RBC # BLD AUTO: 4.14 10*6/MM3 (ref 4.14–5.8)
SODIUM SERPL-SCNC: 137 MMOL/L (ref 136–145)
WBC NRBC COR # BLD AUTO: 12.93 10*3/MM3 (ref 3.4–10.8)

## 2024-07-12 PROCEDURE — 82948 REAGENT STRIP/BLOOD GLUCOSE: CPT | Performed by: FAMILY MEDICINE

## 2024-07-12 PROCEDURE — 80048 BASIC METABOLIC PNL TOTAL CA: CPT | Performed by: FAMILY MEDICINE

## 2024-07-12 PROCEDURE — 85027 COMPLETE CBC AUTOMATED: CPT | Performed by: FAMILY MEDICINE

## 2024-07-12 RX ORDER — ZOLPIDEM TARTRATE 5 MG/1
5 TABLET ORAL NIGHTLY PRN
Qty: 15 TABLET | Refills: 0 | Status: SHIPPED | OUTPATIENT
Start: 2024-07-12

## 2024-07-12 RX ORDER — POLYETHYLENE GLYCOL 3350 17 G/17G
17 POWDER, FOR SOLUTION ORAL DAILY PRN
Qty: 30 PACKET | Refills: 0 | Status: SHIPPED | OUTPATIENT
Start: 2024-07-12

## 2024-07-12 RX ORDER — OXYCODONE HYDROCHLORIDE 5 MG/1
5 TABLET ORAL EVERY 6 HOURS PRN
Qty: 20 TABLET | Refills: 0 | Status: SHIPPED | OUTPATIENT
Start: 2024-07-12 | End: 2024-07-15

## 2024-07-12 RX ADMIN — ACETAMINOPHEN 325 MG: 325 TABLET, FILM COATED ORAL at 04:45

## 2024-07-12 RX ADMIN — SENNOSIDES AND DOCUSATE SODIUM 2 TABLET: 50; 8.6 TABLET ORAL at 08:57

## 2024-07-12 RX ADMIN — AMLODIPINE BESYLATE 5 MG: 5 TABLET ORAL at 08:57

## 2024-07-12 RX ADMIN — PIOGLITAZONE 30 MG: 30 TABLET ORAL at 08:57

## 2024-07-12 RX ADMIN — FLUOXETINE HYDROCHLORIDE 40 MG: 20 CAPSULE ORAL at 08:57

## 2024-07-12 RX ADMIN — OXYCODONE 5 MG: 5 TABLET ORAL at 02:13

## 2024-07-12 RX ADMIN — Medication 10 ML: at 08:57

## 2024-07-12 RX ADMIN — EMPAGLIFLOZIN 25 MG: 25 TABLET, FILM COATED ORAL at 08:57

## 2024-07-12 RX ADMIN — ALLOPURINOL 100 MG: 100 TABLET ORAL at 08:57

## 2024-07-12 RX ADMIN — LEVOTHYROXINE SODIUM 50 MCG: 0.05 TABLET ORAL at 06:10

## 2024-07-12 NOTE — PAYOR COMM NOTE
"Vinh Rich (79 y.o. Male)  1945  Pdg Ref #[id number]   DC notice - Pt Dc'd 24 to SNF  PDG - IP Authorization    AUTHORIZATION PENDING  PLEASE FORWARD DETERMINATION TO FOLLOWING CONTACT:    LANDON REYNOLDS LPN UR  Utilization Review Nurse  [facility]  Direct & confidential phone # [phone]  Fax # [phone]          Date of Birth   1945    Social Security Number       Address   [address] [place] [address] [address] [place] [address]    Home Phone   [phone]    MRN   [id number]       Mandaen   Unknown    Marital Status   Unknown                            Admission Date   24    Admission Type   Emergency    Admitting Provider   Sonia Valadez MD    Attending Provider       Department, Room/Bed   [facility] SURGICAL INPATIENT,        Discharge Date   2024    Discharge Disposition   Skilled Nursing Facility (DC - External)    Discharge Destination                                 Attending Provider: (none)   Allergies: No Known Allergies    Isolation: None   Infection: None   Code Status: CPR    Ht: 172.7 cm (68\")   Wt: 82.4 kg (181 lb 10.5 oz)    Admission Cmt: None   Principal Problem: Right hip pain [M25.551]                   Active Insurance as of 2024       Primary Coverage       Payor Plan Insurance Group Employer/Plan Group    [facility] MEDICARE REPLACEMENT AARP MED ADV HMO [id number]       Payor Plan Address Payor Plan Phone Number Payor Plan Fax Number Effective Dates    [address]   3/1/2024 - None Entered    [facility] [id number]         Subscriber Name Subscriber Birth Date Member ID       VINH RICH 1945 [id number]                     Emergency Contacts        (Rel.) Home Phone Work Phone Mobile Phone    Lacie Bautista (Friend) -- -- [phone]                 Discharge Summary        Niall Shepherd MD at 24 0851          Date of Discharge:  2024    Discharge Diagnosis:     Acute right fibular " fracture  Acute mental status changes  Acute right hip contusion  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          Hospital Course  Patient is a 79 y.o. male who presented with acute fall with an acute right fibular fracture.  He was admitted and evaluated by orthopedic surgery and conservative measures were deemed to be appropriate.  Physical therapy commenced and he was deemed stable for discharge to subacute care today with medications per the discharge reconciliation.  He was found to have some fluctuant mental status changes largely secondary to acute iatrogenic phenomenon.  Mental status changes have resolved in their entirety and he will have close outpatient follow-up as prescribed.    Procedures Performed         Consults:   Consults       Date and Time Order Name Status Description    7/8/2024  6:29 PM Inpatient Orthopedic Surgery Consult Completed     7/8/2024  5:02 PM Family Medicine Consult              Pertinent Test Results:XR Ankle 3+ View Right    Result Date: 7/9/2024  Impression: Distal fibular fracture is unchanged in alignment. Electronically Signed: Landy Stockton MD  7/9/2024 8:54 AM EDT  Workstation ID: [id number]    CT Head Without Contrast    Result Date: 7/9/2024  Impression: 1. Generalized cerebral atrophy, expected for age. 2. Small extra-axial density along the left frontotemporal convexity favored to represent a small incidental dural varix, rather than meningioma or trace aging subdural hematoma. If no old outside studies are available for comparison, short interval follow-up CT scan or brain MRI could be  considered. Electronically Signed: Boo Lundy MD  7/9/2024 7:34 AM EDT  Workstation ID: [id number]    MRI Hip Right Without Contrast    Result Date: 7/9/2024  No fractures of the hip. Moderate arthritis of the hips. Degenerative disc disease and facet degenerative change lumbar spine. Electronically Signed: Oralia Tanner MD  7/9/2024 7:34 AM EDT  Workstation ID: [id number]    XR Chest 1 View    Result Date: 7/8/2024  Impression: There is pleural thickening with questionable trace left apical pneumothorax, most likely artifactual. If further evaluation is warranted, please consider dedicated CT of the chest. Otherwise no definite acute cardiopulmonary abnormality Electronically Signed: Walker Farfan DO  7/8/2024 10:20 PM EDT  Workstation ID: [id number]    CT Lower Extremity Right Without Contrast    Result Date: 7/8/2024  Impression: CT of the hip is nondiagnostic for hip fracture. There is a questionable nondisplaced fracture of the femoral neck could be seen with nondisplaced fracture or vascular groove. If patient has difficulty weightbearing or pain, MRI of the hip is recommended  for better evaluation. Osteopenia limits evaluation. Moderate arthritis of the hip. Electronically Signed: Oralia Tanner MD  7/8/2024 4:50 PM EDT  Workstation ID: [id number]    XR Ankle 3+ View Right    Result Date: 7/8/2024  Impression: Minimally displaced fracture through the distal fibula/lateral malleolus. Electronically Signed: Walker Farfan DO  7/8/2024 3:28 PM EDT  Workstation ID: [id number]     Imaging Results (Last 7 Days)       Procedure Component Value Units Date/Time    XR Ankle 3+ View Right [[id number]] Collected: 07/09/24 0852     Updated: 07/09/24 0857    Narrative:      XR ANKLE 3+ VW RIGHT    Date of Exam: 7/9/2024 8:39 AM EDT    Indication: in boot to see if fibula fracture moved    Comparison: 7/8/2024.    Findings: 3 views through both. Fracture at the base of the lateral malleolus appears unchanged in alignment with  slight lateral displacement of distal fracture fragments in relation to proximal. There is a small avulsion from the tip of the medial   malleolus although may be old. The ankle joint is maintained.      Impression:      Impression:  Distal fibular fracture is unchanged in alignment.      Electronically Signed: Landy Stockton MD    7/9/2024 8:54 AM EDT    Workstation ID: [id number]    CT Head Without Contrast [[id number]] Collected: 07/09/24 0722     Updated: 07/09/24 0736    Narrative:      CT HEAD WO CONTRAST    Date of Exam: 7/9/2024 3:50 AM EDT    Indication: frequent falls, unwitnessed.    Comparison: None available.    Technique: Axial CT images were obtained of the head without contrast administration.  Coronal reconstructions were performed.  Automated exposure control and iterative reconstruction methods were used.      Findings:  The calvarium appears intact. Included paranasal sinuses and mastoids appear clear. Soft tissue axial images show expected degree of generalized cerebral atrophy for the patient's age. No evidence of acute infarction, mass, mass effect or hydrocephalus   is seen.     A small curvilinear, extra-axial intermediate density along the left frontotemporal convexity, initial axial images 24 through 27, coronal images 26 and 25, is favored to represent a small dural varix rather than an unusual small aging subdural hematoma,   and there is no evidence of subdural hematoma or other evidence of hemorrhage elsewhere. There are chronic appearing central white matter changes, mostly in the left frontal white matter, but no obvious infarction..      Impression:      Impression:    1. Generalized cerebral atrophy, expected for age.    2. Small extra-axial density along the left frontotemporal convexity favored to represent a small incidental dural varix, rather than meningioma or trace aging subdural hematoma. If no old outside studies are available for comparison, short interval   follow-up CT  scan or brain MRI could be considered.      Electronically Signed: Boo Lundy MD    7/9/2024 7:34 AM EDT    Workstation ID: [id number]    MRI Hip Right Without Contrast [[id number]] Collected: 07/09/24 0730     Updated: 07/09/24 0736    Narrative:      MRI HIP RIGHT WO CONTRAST    Date of Exam: 7/8/2024 7:55 PM EDT    Indication: right hip pain, frequent falls, abnormal ct.     Comparison: Hip CT 7/8/2024    Technique:  Routine multiplanar/multisequence images of the right hip were obtained without contrast administration.        Findings:  OSSEOUS STRUCTURES  No fracture, stress reaction, avascular necrosis, or focal osseous lesion is seen. Bone marrow signal intensity is normal. Moderate joint space narrowing both hips.    ARTICULAR CARTILAGE AND LABRUM  Articular cartilage: There is no joint space narrowing or cartilage disease.  Labrum: No labral tear or paralabral cyst is identified. The ligamentum teres is normal.  The alpha angle is  within normal limits (<55-60%).    JOINT OR BURSAL EFFUSION  Joint effusion: There is no significant joint effusion.  Bursal effusion: No iliopsoas or trochanteric bursal effusion is present.    MUSCLES AND TENDONS  The rectus femoris, iliopsoas, proximal hamstrings, quadratus femoris, and hip abductors are intact.    OTHER FINDINGS  Miscellaneous: Degenerative disc disease with disc desiccation and disc height loss. Moderate facet degenerative change lumbar spine. There are diverticuli in the colon. There is a urinary bladder diverticulum on the right measuring 1.8 cm.  Motion limits evaluation. However this is a diagnostic exam.    Impression:      No fractures of the hip.  Moderate arthritis of the hips.  Degenerative disc disease and facet degenerative change lumbar spine.      Electronically Signed: Oralia Tanner MD    7/9/2024 7:34 AM EDT    Workstation ID: [id number]    XR Chest 1 View [[id number]] Collected: 07/08/24 2219     Updated: 07/08/24 2222    Narrative:      XR CHEST 1  VW    Date of Exam: 7/8/2024 6:58 PM EDT    Indication: leukocytosis, confusion    Comparison: None available.    Findings:  Lines: None    Lungs: The lungs are clear.  Pleura: Pleural thickening at the left lung apex. Questionable trace left apical pneumothorax.    Cardiomediastinum: The cardiomediastinal silhouette is normal    Soft Tissues: Unremarkable.    Bones: No acute osseous abnormality.      Impression:      Impression:  There is pleural thickening with questionable trace left apical pneumothorax, most likely artifactual. If further evaluation is warranted, please consider dedicated CT of the chest.    Otherwise no definite acute cardiopulmonary abnormality      Electronically Signed: Walker Farfan DO    7/8/2024 10:20 PM EDT    Workstation ID: [id number]    CT Lower Extremity Right Without Contrast [[id number]] Collected: 07/08/24 1646     Updated: 07/08/24 1653    Narrative:      CT LOWER EXTREMITY RIGHT WO CONTRAST    Date of Exam: 7/8/2024 4:42 PM EDT    Indication: right hip pain/ falls neg xray.    Comparison: None available.    Technique: Axial CT images were obtained of the right lower extremity without contrast administration.  Sagittal and coronal reconstructions were performed.  Automated exposure control and iterative reconstruction methods were used.      Findings:  Osteopenia limits evaluation for nondisplaced fractures. If concern for radiographically occult fracture MRI study of choice. There is questionable nondisplaced fracture at the femoral neck best seen on the axial image #158. Avascular channel cannot be   excluded. MRI of the hip is recommended if patient has difficulty weightbearing or pain.    There is moderate degenerative change of the hip joint. There are no aggressive osseous lesions.    No significant joint effusion is seen on CT. There are vascular calcifications. There are diverticuli in the colon.      Impression:      Impression:  CT of the hip is nondiagnostic for hip  fracture. There is a questionable nondisplaced fracture of the femoral neck could be seen with nondisplaced fracture or vascular groove. If patient has difficulty weightbearing or pain, MRI of the hip is recommended   for better evaluation.  Osteopenia limits evaluation.  Moderate arthritis of the hip.        Electronically Signed: Oralia Tanner MD    7/8/2024 4:50 PM EDT    Workstation ID: [id number]    XR Ankle 3+ View Right [[id number]] Collected: 07/08/24 1526     Updated: 07/08/24 1530    Narrative:      XR ANKLE 3+ VW RIGHT    Date of Exam: 7/8/2024 3:18 PM EDT    Indication: pain after falling    Comparison: None available.    Findings:  Minimally displaced fracture through the distal fibula/lateral malleolus. The remainder of the osseous structures are intact.    The ankle mortise is intact.  Mild degenerative changes of the ankle.    Soft tissue edema surrounding the ankle.      Impression:      Impression:  Minimally displaced fracture through the distal fibula/lateral malleolus.      Electronically Signed: Walker Farfan DO    7/8/2024 3:28 PM EDT    Workstation ID: [id number]                Condition on Discharge:  Fair    Vital Signs  Temp:  [97.4 °F (36.3 °C)-97.7 °F (36.5 °C)] 97.6 °F (36.4 °C)  Heart Rate:  [84-98] 92  Resp:  [17-20] 20  BP: (122-149)/(71-82) 132/75    Physical Exam:     General Appearance:    Alert, cooperative, in no acute distress   Head:    Normocephalic, without obvious abnormality, atraumatic   Eyes:           Conjunctivae and sclerae normal, no   icterus, no pallor, corneas clear, PERRLA   Throat:   No oral lesions, no thrush, oral mucosa moist   Neck:   No adenopathy, supple, trachea midline, no thyromegaly, no   carotid bruit, no JVD   Lungs:     Clear to auscultation,respirations regular, even and                  unlabored    Heart:    Regular rhythm and normal rate, normal S1 and S2, no            murmur, no gallop, no rub, no click   Chest Wall:    No abnormalities observed    Abdomen:     Normal bowel sounds, no masses, no organomegaly, soft        non-tender, non-distended, no guarding, no rebound                tenderness   Rectal:     Deferred   Extremities:   Moves all extremities well, no edema, no cyanosis, no             redness   Pulses:   Pulses palpable and equal bilaterally   Skin:   No bleeding, bruising or rash   Lymph nodes:   No palpable adenopathy   Neurologic:   Cranial nerves 2 - 12 grossly intact, sensation intact, DTR       present and equal bilaterally         Discharge Disposition  Skilled Nursing Facility (DC - External)    Discharge Medications     Discharge Medications        New Medications        Instructions Start Date   oxyCODONE 5 MG immediate release tablet  Commonly known as: ROXICODONE   5 mg, Oral, Every 6 Hours PRN      polyethylene glycol 17 g packet  Commonly known as: MIRALAX   17 g, Oral, Daily PRN             Changes to Medications        Instructions Start Date   zolpidem 5 MG tablet  Commonly known as: AMBIEN  What changed:   medication strength  how much to take   5 mg, Oral, Nightly PRN             Continue These Medications        Instructions Start Date   allopurinol 100 MG tablet  Commonly known as: ZYLOPRIM   100 mg, Oral, Daily      amLODIPine 5 MG tablet  Commonly known as: NORVASC   5 mg, Oral, Daily      atorvastatin 40 MG tablet  Commonly known as: LIPITOR   40 mg, Oral, Daily      Farxiga 10 MG tablet  Generic drug: dapagliflozin Propanediol   10 mg, Oral, Daily      FLUoxetine 40 MG capsule  Commonly known as: PROzac   40 mg, Oral, Daily      levothyroxine 50 MCG tablet  Commonly known as: SYNTHROID, LEVOTHROID   50 mcg, Oral, Daily      methocarbamol 750 MG tablet  Commonly known as: ROBAXIN   750 mg, Oral, 3 Times Daily PRN      pioglitazone 30 MG tablet  Commonly known as: ACTOS   30 mg, Oral, Daily               Discharge Diet:   ADA 1800-calorie  Activity at Discharge:   As per physical therapy  Follow-up  Appointments  Follow-up with us within 1 month  Follow-up with orthopedic surgery in 2 weeks      Test Results Pending at Discharge  Pending Labs       Order Current Status    Blood Culture - Blood, Arm, Left Preliminary result             Niall Shepherd MD  07/12/24  08:51 EDT                Electronically signed by Niall Shepherd MD at 07/12/24 0860

## 2024-07-12 NOTE — DISCHARGE SUMMARY
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          Hospital Course  Patient is a 79 y.o. male who presented with acute fall with an acute right fibular fracture.  He was admitted and evaluated by orthopedic surgery and conservative measures were deemed to be appropriate.  Physical therapy commenced and he was deemed stable for discharge to subacute care today with medications per the discharge reconciliation.  He was found to have some fluctuant mental status changes largely secondary to acute iatrogenic phenomenon.  Mental status changes have resolved in their entirety and he will have close outpatient follow-up as prescribed.    Procedures Performed         Consults:   Consults       Date and Time Order Name Status Description    7/8/2024  6:29 PM Inpatient Orthopedic Surgery Consult Completed     7/8/2024  5:02 PM Family Medicine Consult              Pertinent Test Results:XR Ankle 3+ View Right    Result Date: 7/9/2024  Impression: Distal fibular fracture is unchanged in alignment. Electronically Signed: Landy Stockton MD  7/9/2024 8:54 AM EDT  Workstation ID: [id number]    CT Head Without Contrast    Result Date: 7/9/2024  Impression: 1. Generalized cerebral atrophy, expected for age. 2. Small extra-axial density along the left frontotemporal convexity favored to represent a small incidental dural varix, rather than meningioma or trace aging subdural hematoma. If no old outside studies are available  for comparison, short interval follow-up CT scan or brain MRI could be considered. Electronically Signed: Boo Lundy MD  7/9/2024 7:34 AM EDT  Workstation ID: [id number]    MRI Hip Right Without Contrast    Result Date: 7/9/2024  No fractures of the hip. Moderate arthritis of the hips. Degenerative disc disease and facet degenerative change lumbar spine. Electronically Signed: Oralia Tanner MD  7/9/2024 7:34 AM EDT  Workstation ID: [id number]    XR Chest 1 View    Result Date: 7/8/2024  Impression: There is pleural thickening with questionable trace left apical pneumothorax, most likely artifactual. If further evaluation is warranted, please consider dedicated CT of the chest. Otherwise no definite acute cardiopulmonary abnormality Electronically Signed: Walker Farfan DO  7/8/2024 10:20 PM EDT  Workstation ID: [id number]    CT Lower Extremity Right Without Contrast    Result Date: 7/8/2024  Impression: CT of the hip is nondiagnostic for hip fracture. There is a questionable nondisplaced fracture of the femoral neck could be seen with nondisplaced fracture or vascular groove. If patient has difficulty weightbearing or pain, MRI of the hip is recommended  for better evaluation. Osteopenia limits evaluation. Moderate arthritis of the hip. Electronically Signed: Oralia Tanner MD  7/8/2024 4:50 PM EDT  Workstation ID: [id number]    XR Ankle 3+ View Right    Result Date: 7/8/2024  Impression: Minimally displaced fracture through the distal fibula/lateral malleolus. Electronically Signed: Walker Farfan DO  7/8/2024 3:28 PM EDT  Workstation ID: [id number]     Imaging Results (Last 7 Days)       Procedure Component Value Units Date/Time    XR Ankle 3+ View Right [[id number]] Collected: 07/09/24 0852     Updated: 07/09/24 0857    Narrative:      XR ANKLE 3+ VW RIGHT    Date of Exam: 7/9/2024 8:39 AM EDT    Indication: in boot to see if fibula fracture moved    Comparison: 7/8/2024.    Findings: 3 views through both. Fracture at  the base of the lateral malleolus appears unchanged in alignment with slight lateral displacement of distal fracture fragments in relation to proximal. There is a small avulsion from the tip of the medial   malleolus although may be old. The ankle joint is maintained.      Impression:      Impression:  Distal fibular fracture is unchanged in alignment.      Electronically Signed: Landy Stockton MD    7/9/2024 8:54 AM EDT    Workstation ID: [id number]    CT Head Without Contrast [[id number]] Collected: 07/09/24 0722     Updated: 07/09/24 0736    Narrative:      CT HEAD WO CONTRAST    Date of Exam: 7/9/2024 3:50 AM EDT    Indication: frequent falls, unwitnessed.    Comparison: None available.    Technique: Axial CT images were obtained of the head without contrast administration.  Coronal reconstructions were performed.  Automated exposure control and iterative reconstruction methods were used.      Findings:  The calvarium appears intact. Included paranasal sinuses and mastoids appear clear. Soft tissue axial images show expected degree of generalized cerebral atrophy for the patient's age. No evidence of acute infarction, mass, mass effect or hydrocephalus   is seen.     A small curvilinear, extra-axial intermediate density along the left frontotemporal convexity, initial axial images 24 through 27, coronal images 26 and 25, is favored to represent a small dural varix rather than an unusual small aging subdural hematoma,   and there is no evidence of subdural hematoma or other evidence of hemorrhage elsewhere. There are chronic appearing central white matter changes, mostly in the left frontal white matter, but no obvious infarction..      Impression:      Impression:    1. Generalized cerebral atrophy, expected for age.    2. Small extra-axial density along the left frontotemporal convexity favored to represent a small incidental dural varix, rather than meningioma or trace aging subdural hematoma. If no old outside  studies are available for comparison, short interval   follow-up CT scan or brain MRI could be considered.      Electronically Signed: Boo Lundy MD    7/9/2024 7:34 AM EDT    Workstation ID: [id number]    MRI Hip Right Without Contrast [[id number]] Collected: 07/09/24 0730     Updated: 07/09/24 0736    Narrative:      MRI HIP RIGHT WO CONTRAST    Date of Exam: 7/8/2024 7:55 PM EDT    Indication: right hip pain, frequent falls, abnormal ct.     Comparison: Hip CT 7/8/2024    Technique:  Routine multiplanar/multisequence images of the right hip were obtained without contrast administration.        Findings:  OSSEOUS STRUCTURES  No fracture, stress reaction, avascular necrosis, or focal osseous lesion is seen. Bone marrow signal intensity is normal. Moderate joint space narrowing both hips.    ARTICULAR CARTILAGE AND LABRUM  Articular cartilage: There is no joint space narrowing or cartilage disease.  Labrum: No labral tear or paralabral cyst is identified. The ligamentum teres is normal.  The alpha angle is  within normal limits (<55-60%).    JOINT OR BURSAL EFFUSION  Joint effusion: There is no significant joint effusion.  Bursal effusion: No iliopsoas or trochanteric bursal effusion is present.    MUSCLES AND TENDONS  The rectus femoris, iliopsoas, proximal hamstrings, quadratus femoris, and hip abductors are intact.    OTHER FINDINGS  Miscellaneous: Degenerative disc disease with disc desiccation and disc height loss. Moderate facet degenerative change lumbar spine. There are diverticuli in the colon. There is a urinary bladder diverticulum on the right measuring 1.8 cm.  Motion limits evaluation. However this is a diagnostic exam.    Impression:      No fractures of the hip.  Moderate arthritis of the hips.  Degenerative disc disease and facet degenerative change lumbar spine.      Electronically Signed: Oralia Tanner MD    7/9/2024 7:34 AM EDT    Workstation ID: [id number]    XR Chest 1 View [[id number]] Collected:  07/08/24 2219     Updated: 07/08/24 2222    Narrative:      XR CHEST 1 VW    Date of Exam: 7/8/2024 6:58 PM EDT    Indication: leukocytosis, confusion    Comparison: None available.    Findings:  Lines: None    Lungs: The lungs are clear.  Pleura: Pleural thickening at the left lung apex. Questionable trace left apical pneumothorax.    Cardiomediastinum: The cardiomediastinal silhouette is normal    Soft Tissues: Unremarkable.    Bones: No acute osseous abnormality.      Impression:      Impression:  There is pleural thickening with questionable trace left apical pneumothorax, most likely artifactual. If further evaluation is warranted, please consider dedicated CT of the chest.    Otherwise no definite acute cardiopulmonary abnormality      Electronically Signed: Walker Farfan DO    7/8/2024 10:20 PM EDT    Workstation ID: [id number]    CT Lower Extremity Right Without Contrast [[id number]] Collected: 07/08/24 1646     Updated: 07/08/24 1653    Narrative:      CT LOWER EXTREMITY RIGHT WO CONTRAST    Date of Exam: 7/8/2024 4:42 PM EDT    Indication: right hip pain/ falls neg xray.    Comparison: None available.    Technique: Axial CT images were obtained of the right lower extremity without contrast administration.  Sagittal and coronal reconstructions were performed.  Automated exposure control and iterative reconstruction methods were used.      Findings:  Osteopenia limits evaluation for nondisplaced fractures. If concern for radiographically occult fracture MRI study of choice. There is questionable nondisplaced fracture at the femoral neck best seen on the axial image #158. Avascular channel cannot be   excluded. MRI of the hip is recommended if patient has difficulty weightbearing or pain.    There is moderate degenerative change of the hip joint. There are no aggressive osseous lesions.    No significant joint effusion is seen on CT. There are vascular calcifications. There are diverticuli in the colon.       Impression:      Impression:  CT of the hip is nondiagnostic for hip fracture. There is a questionable nondisplaced fracture of the femoral neck could be seen with nondisplaced fracture or vascular groove. If patient has difficulty weightbearing or pain, MRI of the hip is recommended   for better evaluation.  Osteopenia limits evaluation.  Moderate arthritis of the hip.        Electronically Signed: Oralia Tanner MD    7/8/2024 4:50 PM EDT    Workstation ID: [id number]    XR Ankle 3+ View Right [[id number]] Collected: 07/08/24 1526     Updated: 07/08/24 1530    Narrative:      XR ANKLE 3+ VW RIGHT    Date of Exam: 7/8/2024 3:18 PM EDT    Indication: pain after falling    Comparison: None available.    Findings:  Minimally displaced fracture through the distal fibula/lateral malleolus. The remainder of the osseous structures are intact.    The ankle mortise is intact.  Mild degenerative changes of the ankle.    Soft tissue edema surrounding the ankle.      Impression:      Impression:  Minimally displaced fracture through the distal fibula/lateral malleolus.      Electronically Signed: Walker Farfan DO    7/8/2024 3:28 PM EDT    Workstation ID: [id number]                Condition on Discharge:  Fair    Vital Signs  Temp:  [97.4 °F (36.3 °C)-97.7 °F (36.5 °C)] 97.6 °F (36.4 °C)  Heart Rate:  [84-98] 92  Resp:  [17-20] 20  BP: (122-149)/(71-82) 132/75    Physical Exam:     General Appearance:    Alert, cooperative, in no acute distress   Head:    Normocephalic, without obvious abnormality, atraumatic   Eyes:           Conjunctivae and sclerae normal, no   icterus, no pallor, corneas clear, PERRLA   Throat:   No oral lesions, no thrush, oral mucosa moist   Neck:   No adenopathy, supple, trachea midline, no thyromegaly, no   carotid bruit, no JVD   Lungs:     Clear to auscultation,respirations regular, even and                  unlabored    Heart:    Regular rhythm and normal rate, normal S1 and S2, no             murmur, no gallop, no rub, no click   Chest Wall:    No abnormalities observed   Abdomen:     Normal bowel sounds, no masses, no organomegaly, soft        non-tender, non-distended, no guarding, no rebound                tenderness   Rectal:     Deferred   Extremities:   Moves all extremities well, no edema, no cyanosis, no             redness   Pulses:   Pulses palpable and equal bilaterally   Skin:   No bleeding, bruising or rash   Lymph nodes:   No palpable adenopathy   Neurologic:   Cranial nerves 2 - 12 grossly intact, sensation intact, DTR       present and equal bilaterally         Discharge Disposition  Skilled Nursing Facility (DC - External)    Discharge Medications     Discharge Medications        New Medications        Instructions Start Date   oxyCODONE 5 MG immediate release tablet  Commonly known as: ROXICODONE   5 mg, Oral, Every 6 Hours PRN      polyethylene glycol 17 g packet  Commonly known as: MIRALAX   17 g, Oral, Daily PRN             Changes to Medications        Instructions Start Date   zolpidem 5 MG tablet  Commonly known as: AMBIEN  What changed:   medication strength  how much to take   5 mg, Oral, Nightly PRN             Continue These Medications        Instructions Start Date   allopurinol 100 MG tablet  Commonly known as: ZYLOPRIM   100 mg, Oral, Daily      amLODIPine 5 MG tablet  Commonly known as: NORVASC   5 mg, Oral, Daily      atorvastatin 40 MG tablet  Commonly known as: LIPITOR   40 mg, Oral, Daily      Farxiga 10 MG tablet  Generic drug: dapagliflozin Propanediol   10 mg, Oral, Daily      FLUoxetine 40 MG capsule  Commonly known as: PROzac   40 mg, Oral, Daily      levothyroxine 50 MCG tablet  Commonly known as: SYNTHROID, LEVOTHROID   50 mcg, Oral, Daily      methocarbamol 750 MG tablet  Commonly known as: ROBAXIN   750 mg, Oral, 3 Times Daily PRN      pioglitazone 30 MG tablet  Commonly known as: ACTOS   30 mg, Oral, Daily               Discharge Diet:   ADA  1800-calorie  Activity at Discharge:   As per physical therapy  Follow-up Appointments  Follow-up with us within 1 month  Follow-up with orthopedic surgery in 2 weeks      Test Results Pending at Discharge  Pending Labs       Order Current Status    Blood Culture - Blood, Arm, Left Preliminary result             Niall Shepherd MD  07/12/24  08:51 EDT

## 2024-07-12 NOTE — CASE MANAGEMENT/SOCIAL WORK
Continued Stay Note   Anastacio     Patient Name: Yuan Salas  MRN: [id number]  Today's Date: 7/12/2024    Admit Date: 7/8/2024    Plan: DC PLAN: [facility]. [facility] approved ([id number] Valid 7/10-7/12) [facility].       Discharge Plan       Row Name 07/12/24 1025       Plan    Plan DC PLAN: [facility] accepted. [facility] approved ([id number] Valid 7/10-7/12) [facility].    Plan Comments [facility] okay to accept patient, [facility] requested via Ad Hoc via [employer]. IMM reviewed. Pharmacy Verifed                   Discharge Codes    No documentation.                 Expected Discharge Date and Time       Expected Discharge Date Expected Discharge Time    Jul 12, 2024               Edda Guillaume RN

## 2024-07-12 NOTE — PLAN OF CARE
Goal Outcome Evaluation:              Outcome Evaluation: Pt doing well this shift, c/o minimal pain to RLE, no s/sx of distress. CAM boot in place. Pt anticipates d/c to [facility] today. VSS call light in reach, plan of care on going.

## 2024-07-12 NOTE — PLAN OF CARE
Goal Outcome Evaluation:  Plan of Care Reviewed With: patient        Progress: improving  Outcome Evaluation: aox4 but has been still confused on place sometimes throughout the shift. He has been able to be easily reoriented, VSS, pt has c/o pain this shift, and treated per MAR. Call light within reach and plan of care ongoing.

## 2024-07-13 LAB — BACTERIA SPEC AEROBE CULT: NORMAL

## 2024-07-15 NOTE — CASE MANAGEMENT/SOCIAL WORK
Case Management Discharge Note      Final Note: [facility] (SNF)         Selected Continued Care - Discharged on 7/12/2024 Admission date: 7/8/2024 - Discharge disposition: Skilled Nursing Facility (DC - External)      Destination Coordination complete.      Service Provider Selected Services Address Phone Fax Patient Preferred    [facility] [place] [facility] [address] [place] [address] [place] [address] [phone] --                   Transportation Services  W/C Van: Aleena Jorgensen    Final Discharge Disposition Code: 03 - skilled nursing facility (SNF)